# Patient Record
Sex: MALE | Race: BLACK OR AFRICAN AMERICAN | NOT HISPANIC OR LATINO
[De-identification: names, ages, dates, MRNs, and addresses within clinical notes are randomized per-mention and may not be internally consistent; named-entity substitution may affect disease eponyms.]

---

## 2020-11-03 PROBLEM — Z00.00 ENCOUNTER FOR PREVENTIVE HEALTH EXAMINATION: Status: ACTIVE | Noted: 2020-11-03

## 2020-12-07 ENCOUNTER — APPOINTMENT (OUTPATIENT)
Dept: UROLOGY | Facility: CLINIC | Age: 60
End: 2020-12-07
Payer: COMMERCIAL

## 2020-12-07 VITALS
WEIGHT: 220 LBS | HEIGHT: 68 IN | DIASTOLIC BLOOD PRESSURE: 80 MMHG | SYSTOLIC BLOOD PRESSURE: 157 MMHG | HEART RATE: 102 BPM | RESPIRATION RATE: 17 BRPM | TEMPERATURE: 97.6 F | BODY MASS INDEX: 33.34 KG/M2

## 2020-12-07 PROCEDURE — 99205 OFFICE O/P NEW HI 60 MIN: CPT

## 2020-12-07 PROCEDURE — 99072 ADDL SUPL MATRL&STAF TM PHE: CPT
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
## 2020-12-07 NOTE — ASSESSMENT
[FreeTextEntry1] : \par Needs RIGHT PCNL in 2-stages.\par Potential complications of bleeding, transfusion, selective angioembolization if indicated, adjacent organ injury, fever, sepsis, infection, incomplete stone clearance, bowel or other unusual injury, chest complications, vascular injuries, procedures needed to address any complications, ureteral injury, anesthetic complications, all discussed.\par

## 2020-12-07 NOTE — REVIEW OF SYSTEMS
[Eyesight Problems] : eyesight problems [Abdominal Pain] : abdominal pain [see HPI] : see HPI [Seen by urologist before (Name)  ___] : Preciously seen by a urologist: [unfilled] [Urine Infection (bladder/kidney)] : bladder/kidney infection [Told you have blood in urine on a urine test] : told blood was present in a urine test [History of kidney stones] : history of kidney stones [Wake up at night to urinate  How many times?  ___] : wakes up to urinate [unfilled] times during the night [Slow urine stream] : slow urine stream [Joint Pain] : joint pain [Difficulty Walking] : difficulty walking [Hot Flashes] : hot flashes [Negative] : Heme/Lymph

## 2020-12-07 NOTE — PHYSICAL EXAM
[General Appearance - Well Developed] : well developed [General Appearance - Well Nourished] : well nourished [Normal Appearance] : normal appearance [Well Groomed] : well groomed [General Appearance - In No Acute Distress] : no acute distress [] : no respiratory distress [Respiration, Rhythm And Depth] : normal respiratory rhythm and effort [Exaggerated Use Of Accessory Muscles For Inspiration] : no accessory muscle use [Abdomen Soft] : soft [Abdomen Tenderness] : non-tender [Costovertebral Angle Tenderness] : no ~M costovertebral angle tenderness [Normal Station and Gait] : the gait and station were normal for the patient's age [Skin Color & Pigmentation] : normal skin color and pigmentation [Oriented To Time, Place, And Person] : oriented to person, place, and time [Affect] : the affect was normal [Mood] : the mood was normal [Not Anxious] : not anxious

## 2021-01-26 ENCOUNTER — APPOINTMENT (OUTPATIENT)
Dept: UROLOGY | Facility: HOSPITAL | Age: 61
End: 2021-01-26

## 2021-02-23 ENCOUNTER — APPOINTMENT (OUTPATIENT)
Dept: UROLOGY | Facility: HOSPITAL | Age: 61
End: 2021-02-23

## 2021-02-25 ENCOUNTER — APPOINTMENT (OUTPATIENT)
Dept: UROLOGY | Facility: HOSPITAL | Age: 61
End: 2021-02-25

## 2021-06-08 ENCOUNTER — OUTPATIENT (OUTPATIENT)
Dept: OUTPATIENT SERVICES | Facility: HOSPITAL | Age: 61
LOS: 1 days | End: 2021-06-08
Payer: COMMERCIAL

## 2021-06-08 VITALS
HEART RATE: 103 BPM | RESPIRATION RATE: 14 BRPM | TEMPERATURE: 97 F | OXYGEN SATURATION: 97 % | WEIGHT: 217.82 LBS | SYSTOLIC BLOOD PRESSURE: 147 MMHG | HEIGHT: 68 IN | DIASTOLIC BLOOD PRESSURE: 92 MMHG

## 2021-06-08 DIAGNOSIS — Z98.1 ARTHRODESIS STATUS: Chronic | ICD-10-CM

## 2021-06-08 DIAGNOSIS — M51.36 OTHER INTERVERTEBRAL DISC DEGENERATION, LUMBAR REGION: Chronic | ICD-10-CM
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
## 2021-06-08 DIAGNOSIS — N20.0 CALCULUS OF KIDNEY: ICD-10-CM

## 2021-06-08 DIAGNOSIS — M48.061 SPINAL STENOSIS, LUMBAR REGION WITHOUT NEUROGENIC CLAUDICATION: Chronic | ICD-10-CM

## 2021-06-08 DIAGNOSIS — Z98.890 OTHER SPECIFIED POSTPROCEDURAL STATES: Chronic | ICD-10-CM

## 2021-06-08 DIAGNOSIS — Z91.89 OTHER SPECIFIED PERSONAL RISK FACTORS, NOT ELSEWHERE CLASSIFIED: ICD-10-CM

## 2021-06-08 DIAGNOSIS — E11.9 TYPE 2 DIABETES MELLITUS WITHOUT COMPLICATIONS: ICD-10-CM

## 2021-06-08 LAB
A1C WITH ESTIMATED AVERAGE GLUCOSE RESULT: 10.6 % — HIGH (ref 4–5.6)
ANION GAP SERPL CALC-SCNC: 15 MMOL/L — SIGNIFICANT CHANGE UP (ref 5–17)
BLD GP AB SCN SERPL QL: NEGATIVE — SIGNIFICANT CHANGE UP
BUN SERPL-MCNC: 15 MG/DL — SIGNIFICANT CHANGE UP (ref 7–23)
CALCIUM SERPL-MCNC: 9.8 MG/DL — SIGNIFICANT CHANGE UP (ref 8.4–10.5)
CHLORIDE SERPL-SCNC: 100 MMOL/L — SIGNIFICANT CHANGE UP (ref 96–108)
CO2 SERPL-SCNC: 22 MMOL/L — SIGNIFICANT CHANGE UP (ref 22–31)
CREAT SERPL-MCNC: 1.13 MG/DL — SIGNIFICANT CHANGE UP (ref 0.5–1.3)
ESTIMATED AVERAGE GLUCOSE: 258 MG/DL — HIGH (ref 68–114)
GLUCOSE SERPL-MCNC: 231 MG/DL — HIGH (ref 70–99)
HCT VFR BLD CALC: 43.5 % — SIGNIFICANT CHANGE UP (ref 39–50)
HGB BLD-MCNC: 14.2 G/DL — SIGNIFICANT CHANGE UP (ref 13–17)
MCHC RBC-ENTMCNC: 28.1 PG — SIGNIFICANT CHANGE UP (ref 27–34)
MCHC RBC-ENTMCNC: 32.6 GM/DL — SIGNIFICANT CHANGE UP (ref 32–36)
MCV RBC AUTO: 86 FL — SIGNIFICANT CHANGE UP (ref 80–100)
NRBC # BLD: 0 /100 WBCS — SIGNIFICANT CHANGE UP (ref 0–0)
PLATELET # BLD AUTO: 275 K/UL — SIGNIFICANT CHANGE UP (ref 150–400)
POTASSIUM SERPL-MCNC: 4 MMOL/L — SIGNIFICANT CHANGE UP (ref 3.5–5.3)
POTASSIUM SERPL-SCNC: 4 MMOL/L — SIGNIFICANT CHANGE UP (ref 3.5–5.3)
RBC # BLD: 5.06 M/UL — SIGNIFICANT CHANGE UP (ref 4.2–5.8)
RBC # FLD: 15.1 % — HIGH (ref 10.3–14.5)
RH IG SCN BLD-IMP: POSITIVE — SIGNIFICANT CHANGE UP
SODIUM SERPL-SCNC: 137 MMOL/L — SIGNIFICANT CHANGE UP (ref 135–145)
WBC # BLD: 10.12 K/UL — SIGNIFICANT CHANGE UP (ref 3.8–10.5)
WBC # FLD AUTO: 10.12 K/UL — SIGNIFICANT CHANGE UP (ref 3.8–10.5)

## 2021-06-08 RX ORDER — LIDOCAINE HCL 20 MG/ML
0.2 VIAL (ML) INJECTION ONCE
Refills: 0 | Status: DISCONTINUED | OUTPATIENT
Start: 2021-06-22 | End: 2021-06-24

## 2021-06-08 RX ORDER — SODIUM CHLORIDE 9 MG/ML
3 INJECTION INTRAMUSCULAR; INTRAVENOUS; SUBCUTANEOUS EVERY 8 HOURS
Refills: 0 | Status: DISCONTINUED | OUTPATIENT
Start: 2021-06-22 | End: 2021-06-24

## 2021-06-08 RX ORDER — CHLORHEXIDINE GLUCONATE 213 G/1000ML
1 SOLUTION TOPICAL ONCE
Refills: 0 | Status: DISCONTINUED | OUTPATIENT
Start: 2021-06-22 | End: 2021-06-24

## 2021-06-08 RX ORDER — CEFAZOLIN SODIUM 1 G
2000 VIAL (EA) INJECTION ONCE
Refills: 0 | Status: DISCONTINUED | OUTPATIENT
Start: 2021-06-22 | End: 2021-06-25

## 2021-06-08 NOTE — H&P PST ADULT - NSICDXPROBLEM_GEN_ALL_CORE_FT
PROBLEM DIAGNOSES  Problem: Staghorn calculus  Assessment and Plan: Stage I Right Percutaneous Nephrolithotomy for Right Staghorn Stone on 6/22/21  Stage II Right Percutaneous Nephrostolithotomy for Staghorn Stone on 6/24/2021    Problem: Type 2 diabetes mellitus  Assessment and Plan: Pt instructed to hold januvia and Glimeperide on am of surgery  Pt instructed to check FS on am of surgery  Stat FS on admission    Problem: At risk for sleep apnea  Assessment and Plan: RODOLFO Precautions  OR Booking notified
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
## 2021-06-08 NOTE — H&P PST ADULT - ACTIVITY
ADLs, walks 1-2 blocks, walks up 6 steps, light to moderate housework ADLs, walks 1-2 blocks, walks up 6 steps, light to moderate housework, activity limited by low back pain

## 2021-06-08 NOTE — H&P PST ADULT - NSANTHOSAYNRD_GEN_A_CORE
No. RODOLFO screening performed.  STOP BANG Legend: 0-2 = LOW Risk; 3-4 = INTERMEDIATE Risk; 5-8 = HIGH Risk neck = 19.5 inches/No. RODOLFO screening performed.  STOP BANG Legend: 0-2 = LOW Risk; 3-4 = INTERMEDIATE Risk; 5-8 = HIGH Risk

## 2021-06-08 NOTE — H&P PST ADULT - RESPIRATORY AND THORAX
Spoke to ER regarding pt status. In addition to continuing HCTZ, will add a low dose BB to regiment  Will reach out to pt and schedule appmnt for T/W of next week to f/u closely. Please schedule as appropriate with either PCP or myself. Thank you! negative
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
## 2021-06-09 LAB
CULTURE RESULTS: NO GROWTH — SIGNIFICANT CHANGE UP
SPECIMEN SOURCE: SIGNIFICANT CHANGE UP

## 2021-06-16 PROBLEM — N20.0 CALCULUS OF KIDNEY: Chronic | Status: ACTIVE | Noted: 2021-06-08

## 2021-06-16 PROBLEM — E78.5 HYPERLIPIDEMIA, UNSPECIFIED: Chronic | Status: ACTIVE | Noted: 2021-06-08

## 2021-06-16 PROBLEM — M51.36 OTHER INTERVERTEBRAL DISC DEGENERATION, LUMBAR REGION: Chronic | Status: ACTIVE | Noted: 2021-06-08

## 2021-06-16 PROBLEM — E66.9 OBESITY, UNSPECIFIED: Chronic | Status: ACTIVE | Noted: 2021-06-08

## 2021-06-16 PROBLEM — I10 ESSENTIAL (PRIMARY) HYPERTENSION: Chronic | Status: ACTIVE | Noted: 2021-06-08

## 2021-06-16 PROBLEM — E11.9 TYPE 2 DIABETES MELLITUS WITHOUT COMPLICATIONS: Chronic | Status: ACTIVE | Noted: 2021-06-08
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
## 2021-06-16 PROBLEM — Z91.89 OTHER SPECIFIED PERSONAL RISK FACTORS, NOT ELSEWHERE CLASSIFIED: Chronic | Status: ACTIVE | Noted: 2021-06-08

## 2021-06-16 PROBLEM — M48.061 SPINAL STENOSIS, LUMBAR REGION WITHOUT NEUROGENIC CLAUDICATION: Chronic | Status: ACTIVE | Noted: 2021-06-08

## 2021-06-21 ENCOUNTER — TRANSCRIPTION ENCOUNTER (OUTPATIENT)
Age: 61
End: 2021-06-21

## 2021-06-22 ENCOUNTER — INPATIENT (INPATIENT)
Facility: HOSPITAL | Age: 61
LOS: 2 days | Discharge: HOME | DRG: 661 | End: 2021-06-25
Attending: UROLOGY | Admitting: UROLOGY
Payer: COMMERCIAL

## 2021-06-22 ENCOUNTER — APPOINTMENT (OUTPATIENT)
Dept: UROLOGY | Facility: HOSPITAL | Age: 61
End: 2021-06-22

## 2021-06-22 VITALS
WEIGHT: 217.82 LBS | RESPIRATION RATE: 18 BRPM | HEIGHT: 68 IN | DIASTOLIC BLOOD PRESSURE: 98 MMHG | SYSTOLIC BLOOD PRESSURE: 159 MMHG | HEART RATE: 109 BPM | OXYGEN SATURATION: 96 % | TEMPERATURE: 99 F

## 2021-06-22 DIAGNOSIS — Z98.890 OTHER SPECIFIED POSTPROCEDURAL STATES: Chronic | ICD-10-CM

## 2021-06-22 DIAGNOSIS — N20.0 CALCULUS OF KIDNEY: ICD-10-CM

## 2021-06-22 DIAGNOSIS — Z98.1 ARTHRODESIS STATUS: Chronic | ICD-10-CM

## 2021-06-22 LAB
ANION GAP SERPL CALC-SCNC: 18 MMOL/L — HIGH (ref 5–17)
BASOPHILS # BLD AUTO: 0.05 K/UL — SIGNIFICANT CHANGE UP (ref 0–0.2)
BASOPHILS NFR BLD AUTO: 0.3 % — SIGNIFICANT CHANGE UP (ref 0–2)
BUN SERPL-MCNC: 19 MG/DL — SIGNIFICANT CHANGE UP (ref 7–23)
CALCIUM SERPL-MCNC: 9.2 MG/DL — SIGNIFICANT CHANGE UP (ref 8.4–10.5)
CHLORIDE SERPL-SCNC: 100 MMOL/L — SIGNIFICANT CHANGE UP (ref 96–108)
CO2 SERPL-SCNC: 21 MMOL/L — LOW (ref 22–31)
CREAT SERPL-MCNC: 1.22 MG/DL — SIGNIFICANT CHANGE UP (ref 0.5–1.3)
EOSINOPHIL # BLD AUTO: 0.11 K/UL — SIGNIFICANT CHANGE UP (ref 0–0.5)
EOSINOPHIL NFR BLD AUTO: 0.7 % — SIGNIFICANT CHANGE UP (ref 0–6)
GLUCOSE BLDC GLUCOMTR-MCNC: 200 MG/DL — HIGH (ref 70–99)
GLUCOSE BLDC GLUCOMTR-MCNC: 372 MG/DL — HIGH (ref 70–99)
GLUCOSE BLDC GLUCOMTR-MCNC: 373 MG/DL — HIGH (ref 70–99)
GLUCOSE SERPL-MCNC: 352 MG/DL — HIGH (ref 70–99)
HCT VFR BLD CALC: 41.4 % — SIGNIFICANT CHANGE UP (ref 39–50)
HGB BLD-MCNC: 13.3 G/DL — SIGNIFICANT CHANGE UP (ref 13–17)
IMM GRANULOCYTES NFR BLD AUTO: 0.5 % — SIGNIFICANT CHANGE UP (ref 0–1.5)
LYMPHOCYTES # BLD AUTO: 17.9 % — SIGNIFICANT CHANGE UP (ref 13–44)
LYMPHOCYTES # BLD AUTO: 3.01 K/UL — SIGNIFICANT CHANGE UP (ref 1–3.3)
MCHC RBC-ENTMCNC: 28.4 PG — SIGNIFICANT CHANGE UP (ref 27–34)
MCHC RBC-ENTMCNC: 32.1 GM/DL — SIGNIFICANT CHANGE UP (ref 32–36)
MCV RBC AUTO: 88.3 FL — SIGNIFICANT CHANGE UP (ref 80–100)
MONOCYTES # BLD AUTO: 0.37 K/UL — SIGNIFICANT CHANGE UP (ref 0–0.9)
MONOCYTES NFR BLD AUTO: 2.2 % — SIGNIFICANT CHANGE UP (ref 2–14)
NEUTROPHILS # BLD AUTO: 13.17 K/UL — HIGH (ref 1.8–7.4)
NEUTROPHILS NFR BLD AUTO: 78.4 % — HIGH (ref 43–77)
NRBC # BLD: 0 /100 WBCS — SIGNIFICANT CHANGE UP (ref 0–0)
PLATELET # BLD AUTO: 271 K/UL — SIGNIFICANT CHANGE UP (ref 150–400)
POTASSIUM SERPL-MCNC: 4 MMOL/L — SIGNIFICANT CHANGE UP (ref 3.5–5.3)
POTASSIUM SERPL-SCNC: 4 MMOL/L — SIGNIFICANT CHANGE UP (ref 3.5–5.3)
RBC # BLD: 4.69 M/UL — SIGNIFICANT CHANGE UP (ref 4.2–5.8)
RBC # FLD: 15 % — HIGH (ref 10.3–14.5)
SODIUM SERPL-SCNC: 139 MMOL/L — SIGNIFICANT CHANGE UP (ref 135–145)
WBC # BLD: 16.8 K/UL — HIGH (ref 3.8–10.5)
WBC # FLD AUTO: 16.8 K/UL — HIGH (ref 3.8–10.5)

## 2021-06-22 PROCEDURE — 71045 X-RAY EXAM CHEST 1 VIEW: CPT | Mod: 26

## 2021-06-22 PROCEDURE — 86901 BLOOD TYPING SEROLOGIC RH(D): CPT

## 2021-06-22 PROCEDURE — 85027 COMPLETE CBC AUTOMATED: CPT

## 2021-06-22 PROCEDURE — G0463: CPT

## 2021-06-22 PROCEDURE — 86900 BLOOD TYPING SEROLOGIC ABO: CPT

## 2021-06-22 PROCEDURE — 76000 FLUOROSCOPY <1 HR PHYS/QHP: CPT

## 2021-06-22 PROCEDURE — 80048 BASIC METABOLIC PNL TOTAL CA: CPT

## 2021-06-22 PROCEDURE — 87086 URINE CULTURE/COLONY COUNT: CPT

## 2021-06-22 PROCEDURE — 83036 HEMOGLOBIN GLYCOSYLATED A1C: CPT

## 2021-06-22 PROCEDURE — 86850 RBC ANTIBODY SCREEN: CPT

## 2021-06-22 RX ORDER — AMLODIPINE BESYLATE 2.5 MG/1
10 TABLET ORAL DAILY
Refills: 0 | Status: DISCONTINUED | OUTPATIENT
Start: 2021-06-22 | End: 2021-06-24

## 2021-06-22 RX ORDER — LORATADINE 10 MG/1
1 TABLET ORAL
Qty: 0 | Refills: 0 | DISCHARGE

## 2021-06-22 RX ORDER — ATORVASTATIN CALCIUM 80 MG/1
10 TABLET, FILM COATED ORAL AT BEDTIME
Refills: 0 | Status: DISCONTINUED | OUTPATIENT
Start: 2021-06-22 | End: 2021-06-24

## 2021-06-22 RX ORDER — INSULIN LISPRO 100/ML
VIAL (ML) SUBCUTANEOUS AT BEDTIME
Refills: 0 | Status: DISCONTINUED | OUTPATIENT
Start: 2021-06-22 | End: 2021-06-23

## 2021-06-22 RX ORDER — DEXTROSE 50 % IN WATER 50 %
25 SYRINGE (ML) INTRAVENOUS ONCE
Refills: 0 | Status: DISCONTINUED | OUTPATIENT
Start: 2021-06-22 | End: 2021-06-24

## 2021-06-22 RX ORDER — AMLODIPINE BESYLATE 2.5 MG/1
1 TABLET ORAL
Qty: 0 | Refills: 0 | DISCHARGE

## 2021-06-22 RX ORDER — GLUCAGON INJECTION, SOLUTION 0.5 MG/.1ML
1 INJECTION, SOLUTION SUBCUTANEOUS ONCE
Refills: 0 | Status: DISCONTINUED | OUTPATIENT
Start: 2021-06-22 | End: 2021-06-24

## 2021-06-22 RX ORDER — BENZOCAINE AND MENTHOL 5; 1 G/100ML; G/100ML
1 LIQUID ORAL
Refills: 0 | Status: DISCONTINUED | OUTPATIENT
Start: 2021-06-22 | End: 2021-06-24

## 2021-06-22 RX ORDER — ONDANSETRON 8 MG/1
4 TABLET, FILM COATED ORAL ONCE
Refills: 0 | Status: DISCONTINUED | OUTPATIENT
Start: 2021-06-22 | End: 2021-06-22

## 2021-06-22 RX ORDER — SODIUM CHLORIDE 9 MG/ML
1000 INJECTION, SOLUTION INTRAVENOUS
Refills: 0 | Status: DISCONTINUED | OUTPATIENT
Start: 2021-06-22 | End: 2021-06-24

## 2021-06-22 RX ORDER — ATORVASTATIN CALCIUM 80 MG/1
1 TABLET, FILM COATED ORAL
Qty: 0 | Refills: 0 | DISCHARGE

## 2021-06-22 RX ORDER — SENNA PLUS 8.6 MG/1
2 TABLET ORAL AT BEDTIME
Refills: 0 | Status: DISCONTINUED | OUTPATIENT
Start: 2021-06-22 | End: 2021-06-24

## 2021-06-22 RX ORDER — POTASSIUM CITRATE MONOHYDRATE 100 %
1 POWDER (GRAM) MISCELLANEOUS
Qty: 0 | Refills: 0 | DISCHARGE

## 2021-06-22 RX ORDER — DEXTROSE 50 % IN WATER 50 %
15 SYRINGE (ML) INTRAVENOUS ONCE
Refills: 0 | Status: DISCONTINUED | OUTPATIENT
Start: 2021-06-22 | End: 2021-06-24

## 2021-06-22 RX ORDER — ACETAMINOPHEN 500 MG
650 TABLET ORAL EVERY 6 HOURS
Refills: 0 | Status: DISCONTINUED | OUTPATIENT
Start: 2021-06-22 | End: 2021-06-24

## 2021-06-22 RX ORDER — HYDROMORPHONE HYDROCHLORIDE 2 MG/ML
0.25 INJECTION INTRAMUSCULAR; INTRAVENOUS; SUBCUTANEOUS
Refills: 0 | Status: DISCONTINUED | OUTPATIENT
Start: 2021-06-22 | End: 2021-06-22

## 2021-06-22 RX ORDER — OXYCODONE HYDROCHLORIDE 5 MG/1
10 TABLET ORAL EVERY 4 HOURS
Refills: 0 | Status: DISCONTINUED | OUTPATIENT
Start: 2021-06-22 | End: 2021-06-24

## 2021-06-22 RX ORDER — INSULIN LISPRO 100/ML
VIAL (ML) SUBCUTANEOUS
Refills: 0 | Status: DISCONTINUED | OUTPATIENT
Start: 2021-06-22 | End: 2021-06-23

## 2021-06-22 RX ORDER — CEFAZOLIN SODIUM 1 G
1000 VIAL (EA) INJECTION EVERY 8 HOURS
Refills: 0 | Status: DISCONTINUED | OUTPATIENT
Start: 2021-06-22 | End: 2021-06-24

## 2021-06-22 RX ORDER — OXYCODONE HYDROCHLORIDE 5 MG/1
5 TABLET ORAL EVERY 4 HOURS
Refills: 0 | Status: DISCONTINUED | OUTPATIENT
Start: 2021-06-22 | End: 2021-06-24

## 2021-06-22 RX ORDER — HEPARIN SODIUM 5000 [USP'U]/ML
5000 INJECTION INTRAVENOUS; SUBCUTANEOUS EVERY 8 HOURS
Refills: 0 | Status: DISCONTINUED | OUTPATIENT
Start: 2021-06-22 | End: 2021-06-24

## 2021-06-22 RX ADMIN — HYDROMORPHONE HYDROCHLORIDE 0.25 MILLIGRAM(S): 2 INJECTION INTRAMUSCULAR; INTRAVENOUS; SUBCUTANEOUS at 19:25

## 2021-06-22 RX ADMIN — OXYCODONE HYDROCHLORIDE 10 MILLIGRAM(S): 5 TABLET ORAL at 21:37

## 2021-06-22 RX ADMIN — Medication 3: at 22:00

## 2021-06-22 RX ADMIN — HYDROMORPHONE HYDROCHLORIDE 0.25 MILLIGRAM(S): 2 INJECTION INTRAMUSCULAR; INTRAVENOUS; SUBCUTANEOUS at 19:35

## 2021-06-22 RX ADMIN — Medication 5: at 20:37

## 2021-06-22 RX ADMIN — HYDROMORPHONE HYDROCHLORIDE 0.25 MILLIGRAM(S): 2 INJECTION INTRAMUSCULAR; INTRAVENOUS; SUBCUTANEOUS at 20:16

## 2021-06-22 RX ADMIN — OXYCODONE HYDROCHLORIDE 10 MILLIGRAM(S): 5 TABLET ORAL at 21:52

## 2021-06-22 RX ADMIN — HYDROMORPHONE HYDROCHLORIDE 0.25 MILLIGRAM(S): 2 INJECTION INTRAMUSCULAR; INTRAVENOUS; SUBCUTANEOUS at 20:33

## 2021-06-22 RX ADMIN — HYDROMORPHONE HYDROCHLORIDE 0.25 MILLIGRAM(S): 2 INJECTION INTRAMUSCULAR; INTRAVENOUS; SUBCUTANEOUS at 21:05

## 2021-06-22 RX ADMIN — HYDROMORPHONE HYDROCHLORIDE 0.25 MILLIGRAM(S): 2 INJECTION INTRAMUSCULAR; INTRAVENOUS; SUBCUTANEOUS at 20:46

## 2021-06-22 RX ADMIN — SODIUM CHLORIDE 125 MILLILITER(S): 9 INJECTION, SOLUTION INTRAVENOUS at 19:00

## 2021-06-22 NOTE — ASU PATIENT PROFILE, ADULT - VISION (WITH CORRECTIVE LENSES IF THE PATIENT USUALLY WEARS THEM):
Normal vision: sees adequately in most situations; can see medication labels, newsprint
Medical Records sent

## 2021-06-23 ENCOUNTER — RESULT REVIEW (OUTPATIENT)
Age: 61
End: 2021-06-23

## 2021-06-23 ENCOUNTER — TRANSCRIPTION ENCOUNTER (OUTPATIENT)
Age: 61
End: 2021-06-23

## 2021-06-23 DIAGNOSIS — E11.65 TYPE 2 DIABETES MELLITUS WITH HYPERGLYCEMIA: ICD-10-CM

## 2021-06-23 DIAGNOSIS — E78.5 HYPERLIPIDEMIA, UNSPECIFIED: ICD-10-CM

## 2021-06-23 DIAGNOSIS — I10 ESSENTIAL (PRIMARY) HYPERTENSION: ICD-10-CM

## 2021-06-23 LAB
ANION GAP SERPL CALC-SCNC: 18 MMOL/L — HIGH (ref 5–17)
BASOPHILS # BLD AUTO: 0.02 K/UL — SIGNIFICANT CHANGE UP (ref 0–0.2)
BASOPHILS NFR BLD AUTO: 0.2 % — SIGNIFICANT CHANGE UP (ref 0–2)
BUN SERPL-MCNC: 19 MG/DL — SIGNIFICANT CHANGE UP (ref 7–23)
CALCIUM SERPL-MCNC: 9.5 MG/DL — SIGNIFICANT CHANGE UP (ref 8.4–10.5)
CHLORIDE SERPL-SCNC: 98 MMOL/L — SIGNIFICANT CHANGE UP (ref 96–108)
CO2 SERPL-SCNC: 20 MMOL/L — LOW (ref 22–31)
COVID-19 SPIKE DOMAIN AB INTERP: POSITIVE
COVID-19 SPIKE DOMAIN ANTIBODY RESULT: >250 U/ML — HIGH
CREAT SERPL-MCNC: 1.15 MG/DL — SIGNIFICANT CHANGE UP (ref 0.5–1.3)
EOSINOPHIL # BLD AUTO: 0.04 K/UL — SIGNIFICANT CHANGE UP (ref 0–0.5)
EOSINOPHIL NFR BLD AUTO: 0.3 % — SIGNIFICANT CHANGE UP (ref 0–6)
GLUCOSE BLDC GLUCOMTR-MCNC: 206 MG/DL — HIGH (ref 70–99)
GLUCOSE BLDC GLUCOMTR-MCNC: 232 MG/DL — HIGH (ref 70–99)
GLUCOSE BLDC GLUCOMTR-MCNC: 283 MG/DL — HIGH (ref 70–99)
GLUCOSE BLDC GLUCOMTR-MCNC: 311 MG/DL — HIGH (ref 70–99)
GLUCOSE BLDC GLUCOMTR-MCNC: 311 MG/DL — HIGH (ref 70–99)
GLUCOSE SERPL-MCNC: 329 MG/DL — HIGH (ref 70–99)
HCT VFR BLD CALC: 37.7 % — LOW (ref 39–50)
HGB BLD-MCNC: 12.2 G/DL — LOW (ref 13–17)
IMM GRANULOCYTES NFR BLD AUTO: 0.5 % — SIGNIFICANT CHANGE UP (ref 0–1.5)
LYMPHOCYTES # BLD AUTO: 1.51 K/UL — SIGNIFICANT CHANGE UP (ref 1–3.3)
LYMPHOCYTES # BLD AUTO: 13 % — SIGNIFICANT CHANGE UP (ref 13–44)
MCHC RBC-ENTMCNC: 28.2 PG — SIGNIFICANT CHANGE UP (ref 27–34)
MCHC RBC-ENTMCNC: 32.4 GM/DL — SIGNIFICANT CHANGE UP (ref 32–36)
MCV RBC AUTO: 87.1 FL — SIGNIFICANT CHANGE UP (ref 80–100)
MONOCYTES # BLD AUTO: 0.53 K/UL — SIGNIFICANT CHANGE UP (ref 0–0.9)
MONOCYTES NFR BLD AUTO: 4.6 % — SIGNIFICANT CHANGE UP (ref 2–14)
NEUTROPHILS # BLD AUTO: 9.42 K/UL — HIGH (ref 1.8–7.4)
NEUTROPHILS NFR BLD AUTO: 81.4 % — HIGH (ref 43–77)
NRBC # BLD: 0 /100 WBCS — SIGNIFICANT CHANGE UP (ref 0–0)
PLATELET # BLD AUTO: 253 K/UL — SIGNIFICANT CHANGE UP (ref 150–400)
POTASSIUM SERPL-MCNC: 5 MMOL/L — SIGNIFICANT CHANGE UP (ref 3.5–5.3)
POTASSIUM SERPL-SCNC: 5 MMOL/L — SIGNIFICANT CHANGE UP (ref 3.5–5.3)
RBC # BLD: 4.33 M/UL — SIGNIFICANT CHANGE UP (ref 4.2–5.8)
RBC # FLD: 15.3 % — HIGH (ref 10.3–14.5)
SARS-COV-2 IGG+IGM SERPL QL IA: >250 U/ML — HIGH
SARS-COV-2 IGG+IGM SERPL QL IA: POSITIVE
SODIUM SERPL-SCNC: 136 MMOL/L — SIGNIFICANT CHANGE UP (ref 135–145)
WBC # BLD: 11.58 K/UL — HIGH (ref 3.8–10.5)
WBC # FLD AUTO: 11.58 K/UL — HIGH (ref 3.8–10.5)

## 2021-06-23 PROCEDURE — 99223 1ST HOSP IP/OBS HIGH 75: CPT | Mod: GC

## 2021-06-23 PROCEDURE — 88300 SURGICAL PATH GROSS: CPT | Mod: 26

## 2021-06-23 PROCEDURE — 93010 ELECTROCARDIOGRAM REPORT: CPT

## 2021-06-23 PROCEDURE — 74176 CT ABD & PELVIS W/O CONTRAST: CPT | Mod: 26

## 2021-06-23 RX ORDER — INSULIN LISPRO 100/ML
VIAL (ML) SUBCUTANEOUS AT BEDTIME
Refills: 0 | Status: DISCONTINUED | OUTPATIENT
Start: 2021-06-23 | End: 2021-06-23

## 2021-06-23 RX ORDER — INSULIN LISPRO 100/ML
7 VIAL (ML) SUBCUTANEOUS
Refills: 0 | Status: DISCONTINUED | OUTPATIENT
Start: 2021-06-23 | End: 2021-06-23

## 2021-06-23 RX ORDER — ONDANSETRON 8 MG/1
4 TABLET, FILM COATED ORAL ONCE
Refills: 0 | Status: COMPLETED | OUTPATIENT
Start: 2021-06-23 | End: 2021-06-23

## 2021-06-23 RX ORDER — INSULIN LISPRO 100/ML
6 VIAL (ML) SUBCUTANEOUS
Refills: 0 | Status: DISCONTINUED | OUTPATIENT
Start: 2021-06-23 | End: 2021-06-23

## 2021-06-23 RX ORDER — INSULIN GLARGINE 100 [IU]/ML
16 INJECTION, SOLUTION SUBCUTANEOUS AT BEDTIME
Refills: 0 | Status: DISCONTINUED | OUTPATIENT
Start: 2021-06-23 | End: 2021-06-24

## 2021-06-23 RX ORDER — DEXTROSE 50 % IN WATER 50 %
15 SYRINGE (ML) INTRAVENOUS ONCE
Refills: 0 | Status: DISCONTINUED | OUTPATIENT
Start: 2021-06-23 | End: 2021-06-24

## 2021-06-23 RX ORDER — INSULIN LISPRO 100/ML
VIAL (ML) SUBCUTANEOUS EVERY 6 HOURS
Refills: 0 | Status: DISCONTINUED | OUTPATIENT
Start: 2021-06-23 | End: 2021-06-24

## 2021-06-23 RX ORDER — INSULIN GLARGINE 100 [IU]/ML
10 INJECTION, SOLUTION SUBCUTANEOUS AT BEDTIME
Refills: 0 | Status: DISCONTINUED | OUTPATIENT
Start: 2021-06-23 | End: 2021-06-23

## 2021-06-23 RX ORDER — INSULIN LISPRO 100/ML
VIAL (ML) SUBCUTANEOUS
Refills: 0 | Status: DISCONTINUED | OUTPATIENT
Start: 2021-06-23 | End: 2021-06-23

## 2021-06-23 RX ORDER — GLYCERIN 1 %
1 DROPS OPHTHALMIC (EYE) EVERY 8 HOURS
Refills: 0 | Status: DISCONTINUED | OUTPATIENT
Start: 2021-06-23 | End: 2021-06-24

## 2021-06-23 RX ORDER — POLYETHYLENE GLYCOL 3350 17 G/17G
17 POWDER, FOR SOLUTION ORAL DAILY
Refills: 0 | Status: DISCONTINUED | OUTPATIENT
Start: 2021-06-23 | End: 2021-06-24

## 2021-06-23 RX ORDER — SIMETHICONE 80 MG/1
80 TABLET, CHEWABLE ORAL ONCE
Refills: 0 | Status: COMPLETED | OUTPATIENT
Start: 2021-06-23 | End: 2021-06-23

## 2021-06-23 RX ADMIN — Medication 100 MILLIGRAM(S): at 16:47

## 2021-06-23 RX ADMIN — HEPARIN SODIUM 5000 UNIT(S): 5000 INJECTION INTRAVENOUS; SUBCUTANEOUS at 08:58

## 2021-06-23 RX ADMIN — SODIUM CHLORIDE 3 MILLILITER(S): 9 INJECTION INTRAMUSCULAR; INTRAVENOUS; SUBCUTANEOUS at 05:30

## 2021-06-23 RX ADMIN — Medication 100 MILLIGRAM(S): at 00:47

## 2021-06-23 RX ADMIN — OXYCODONE HYDROCHLORIDE 5 MILLIGRAM(S): 5 TABLET ORAL at 00:30

## 2021-06-23 RX ADMIN — HEPARIN SODIUM 5000 UNIT(S): 5000 INJECTION INTRAVENOUS; SUBCUTANEOUS at 00:47

## 2021-06-23 RX ADMIN — BENZOCAINE AND MENTHOL 1 LOZENGE: 5; 1 LIQUID ORAL at 00:00

## 2021-06-23 RX ADMIN — OXYCODONE HYDROCHLORIDE 10 MILLIGRAM(S): 5 TABLET ORAL at 22:55

## 2021-06-23 RX ADMIN — POLYETHYLENE GLYCOL 3350 17 GRAM(S): 17 POWDER, FOR SOLUTION ORAL at 12:58

## 2021-06-23 RX ADMIN — OXYCODONE HYDROCHLORIDE 5 MILLIGRAM(S): 5 TABLET ORAL at 00:00

## 2021-06-23 RX ADMIN — Medication 10 MILLIGRAM(S): at 05:19

## 2021-06-23 RX ADMIN — SODIUM CHLORIDE 3 MILLILITER(S): 9 INJECTION INTRAMUSCULAR; INTRAVENOUS; SUBCUTANEOUS at 23:21

## 2021-06-23 RX ADMIN — AMLODIPINE BESYLATE 10 MILLIGRAM(S): 2.5 TABLET ORAL at 05:19

## 2021-06-23 RX ADMIN — SODIUM CHLORIDE 125 MILLILITER(S): 9 INJECTION, SOLUTION INTRAVENOUS at 12:58

## 2021-06-23 RX ADMIN — Medication 6: at 14:56

## 2021-06-23 RX ADMIN — SIMETHICONE 80 MILLIGRAM(S): 80 TABLET, CHEWABLE ORAL at 22:40

## 2021-06-23 RX ADMIN — ATORVASTATIN CALCIUM 10 MILLIGRAM(S): 80 TABLET, FILM COATED ORAL at 00:53

## 2021-06-23 RX ADMIN — ATORVASTATIN CALCIUM 10 MILLIGRAM(S): 80 TABLET, FILM COATED ORAL at 22:55

## 2021-06-23 RX ADMIN — OXYCODONE HYDROCHLORIDE 10 MILLIGRAM(S): 5 TABLET ORAL at 02:17

## 2021-06-23 RX ADMIN — INSULIN GLARGINE 16 UNIT(S): 100 INJECTION, SOLUTION SUBCUTANEOUS at 23:00

## 2021-06-23 RX ADMIN — OXYCODONE HYDROCHLORIDE 10 MILLIGRAM(S): 5 TABLET ORAL at 02:47

## 2021-06-23 RX ADMIN — BENZOCAINE AND MENTHOL 1 LOZENGE: 5; 1 LIQUID ORAL at 23:46

## 2021-06-23 RX ADMIN — Medication 1 DROP(S): at 18:34

## 2021-06-23 RX ADMIN — Medication 100 MILLIGRAM(S): at 08:58

## 2021-06-23 RX ADMIN — SODIUM CHLORIDE 3 MILLILITER(S): 9 INJECTION INTRAMUSCULAR; INTRAVENOUS; SUBCUTANEOUS at 01:13

## 2021-06-23 RX ADMIN — HEPARIN SODIUM 5000 UNIT(S): 5000 INJECTION INTRAVENOUS; SUBCUTANEOUS at 16:50

## 2021-06-23 RX ADMIN — Medication 8: at 08:56

## 2021-06-23 RX ADMIN — OXYCODONE HYDROCHLORIDE 10 MILLIGRAM(S): 5 TABLET ORAL at 23:25

## 2021-06-23 RX ADMIN — ONDANSETRON 4 MILLIGRAM(S): 8 TABLET, FILM COATED ORAL at 07:31

## 2021-06-23 RX ADMIN — SODIUM CHLORIDE 3 MILLILITER(S): 9 INJECTION INTRAMUSCULAR; INTRAVENOUS; SUBCUTANEOUS at 15:00

## 2021-06-23 RX ADMIN — BENZOCAINE AND MENTHOL 1 LOZENGE: 5; 1 LIQUID ORAL at 16:48

## 2021-06-23 RX ADMIN — Medication 4: at 23:47

## 2021-06-23 NOTE — CONSULT NOTE ADULT - ATTENDING COMMENTS
Agree with assessment and plan as above by Dr. Tay. Reviewed all pertinent labs, glucose values, and imaging studies. Modifications made as indicated above. Pt. to be NPO for procedure tomorrow. Recommend Lantus 16 units qhs. Admelog 6 units with meals. Pt. reluctant to be discharged on insulin would consider GLP-1 instead of januvia and adding SGLT2.     Palmer Ortiz D.O  [phone]
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
## 2021-06-23 NOTE — CONSULT NOTE ADULT - ASSESSMENT
59 yo M with T2DM (A1C 10.6 on orals). Also with HTN, HLD, recurrent nephrolithiasis per pt. Admitted for percutaneous nephrolithotomy    #T2DM uncontrolled with suspected stress exacerbated hyperglycemia from illness  - goal glucose 100-180  - Lantus 20 units qhs  - Admelog 7 units TID pre-meal  - moderate pre-meal correction scale and moderate HS correction scale  - check FS AC/HS  - carb consistent diet  - registered dietician eval  Discharge    #HTN    #HLD        61 yo M with T2DM (A1C 10.6 on orals). Also with HTN, HLD, recurrent nephrolithiasis per pt. Admitted for percutaneous nephrolithotomy    #T2DM uncontrolled with suspected stress exacerbated hyperglycemia from illness. Glucose levels above goal on moderate scales. Poor PO intake. NPO MN for OR tomorrow (scheduled for later afternoon per team)  - goal glucose 100-180  - Lantus 16 units qhs (would have recommended 18 units but given NPO until late afternoon tomorrow, will decrease dose slightly tonight)  - Admelog 6 units TID pre-meal  - moderate pre-meal correction scale and moderate HS correction scale  - check FS AC/HS  - carb consistent diet  - registered dietician eval  Discharge  - Anticipate discharge on GLP-1 agonist + SGLT-2i with dietary modifications. Will finalize.  - outpatient f/u with Dr. Yecenia Le Address: [address] [place] [address] [address] [id number]  Phone: [phone]    #HTN  goal BP<130/80 (currently above goal)  manage per primary team    #HLD  continue statin    DW Team    Rosa Isela Tay DO, Endocrine Fellow   Pager [phone] from 9-5PM. After hours and on weekends please call [phone].      59 yo M with T2DM (A1C 10.6 on orals). Also with HTN, HLD, recurrent nephrolithiasis per pt. Admitted for percutaneous nephrolithotomy. Endocrine consulted for T2DM management    #T2DM uncontrolled with hyperglycemia likely stress exacerbated from illness. Glucose levels above goal on moderate scales. Poor PO intake. NPO MN for OR tomorrow (scheduled for later afternoon per team)  - goal glucose 100-180  - Lantus 16 units qhs (would have recommended 18 units but given NPO until late afternoon tomorrow, will decrease dose slightly tonight)  - Admelog 6 units TID pre-meal  - moderate pre-meal correction scale and moderate HS correction scale  - check FS AC/HS  - carb consistent diet  - registered dietician eval  Discharge  - Anticipate discharge on GLP-1 agonist + SGLT-2i with dietary modifications. Will finalize.  - outpatient f/u with Dr. Yecenia Le Address: [address] [place] [address] [address], [place] [id number]  Phone: [phone]    #HTN  goal BP<130/80 (currently above goal)  manage per primary team    #HLD  continue statin    DW Team    Rosa Isela Tay DO, Endocrine Fellow   Pager [phone] from 9-5PM. After hours and on weekends please call [phone].      61 yo M with T2DM (A1C 10.6 on orals). Also with HTN, HLD, recurrent nephrolithiasis per pt. Admitted for percutaneous nephrolithotomy. Endocrine consulted for T2DM management (high risk patient with severely uncontrolled Type 2 DM w/ A1c of 10.6% at high risk of CAD and CVA with high level decision-making).     #T2DM uncontrolled with hyperglycemia likely stress exacerbated from illness. Glucose levels above goal on moderate scales. Poor PO intake. NPO [place] for OR tomorrow (scheduled for later afternoon per team)  - goal glucose 100-180  - Lantus 16 units qhs (would have recommended 18 units but given NPO until late afternoon tomorrow, will decrease dose slightly tonight)  - Admelog 6 units TID pre-meal  - moderate pre-meal correction scale and moderate HS correction scale  - check FS AC/HS  - carb consistent diet  - registered dietician eval  Discharge  - Anticipate discharge on GLP-1 agonist + SGLT-2i with dietary modifications. Will finalize.  - outpatient f/u with Dr. Yecenia Le Address: [address] [place] [address] [address], [place] [id number]  Phone: [phone]    #HTN  goal BP<130/80 (currently above goal)  manage per primary team    #HLD  continue statin    DW Team    Rosa Isela Tay DO, Endocrine Fellow   Pager [phone] from 9-5PM. After hours and on weekends please call [phone].

## 2021-06-24 ENCOUNTER — APPOINTMENT (OUTPATIENT)
Dept: UROLOGY | Facility: HOSPITAL | Age: 61
End: 2021-06-24

## 2021-06-24 ENCOUNTER — RESULT REVIEW (OUTPATIENT)
Age: 61
End: 2021-06-24

## 2021-06-24 LAB
ANION GAP SERPL CALC-SCNC: 15 MMOL/L — SIGNIFICANT CHANGE UP (ref 5–17)
ANION GAP SERPL CALC-SCNC: 16 MMOL/L — SIGNIFICANT CHANGE UP (ref 5–17)
BLD GP AB SCN SERPL QL: NEGATIVE — SIGNIFICANT CHANGE UP
BUN SERPL-MCNC: 17 MG/DL — SIGNIFICANT CHANGE UP (ref 7–23)
BUN SERPL-MCNC: 17 MG/DL — SIGNIFICANT CHANGE UP (ref 7–23)
CALCIUM SERPL-MCNC: 8.9 MG/DL — SIGNIFICANT CHANGE UP (ref 8.4–10.5)
CALCIUM SERPL-MCNC: 9.1 MG/DL — SIGNIFICANT CHANGE UP (ref 8.4–10.5)
CHLORIDE SERPL-SCNC: 101 MMOL/L — SIGNIFICANT CHANGE UP (ref 96–108)
CHLORIDE SERPL-SCNC: 99 MMOL/L — SIGNIFICANT CHANGE UP (ref 96–108)
CO2 SERPL-SCNC: 23 MMOL/L — SIGNIFICANT CHANGE UP (ref 22–31)
CO2 SERPL-SCNC: 23 MMOL/L — SIGNIFICANT CHANGE UP (ref 22–31)
CREAT SERPL-MCNC: 1.17 MG/DL — SIGNIFICANT CHANGE UP (ref 0.5–1.3)
CREAT SERPL-MCNC: 1.17 MG/DL — SIGNIFICANT CHANGE UP (ref 0.5–1.3)
CULTURE RESULTS: SIGNIFICANT CHANGE UP
GLUCOSE BLDC GLUCOMTR-MCNC: 207 MG/DL — HIGH (ref 70–99)
GLUCOSE BLDC GLUCOMTR-MCNC: 238 MG/DL — HIGH (ref 70–99)
GLUCOSE BLDC GLUCOMTR-MCNC: 253 MG/DL — HIGH (ref 70–99)
GLUCOSE SERPL-MCNC: 220 MG/DL — HIGH (ref 70–99)
GLUCOSE SERPL-MCNC: 250 MG/DL — HIGH (ref 70–99)
HCT VFR BLD CALC: 37.7 % — LOW (ref 39–50)
HCT VFR BLD CALC: 38.3 % — LOW (ref 39–50)
HGB BLD-MCNC: 12.1 G/DL — LOW (ref 13–17)
HGB BLD-MCNC: 12.4 G/DL — LOW (ref 13–17)
MCHC RBC-ENTMCNC: 27.9 PG — SIGNIFICANT CHANGE UP (ref 27–34)
MCHC RBC-ENTMCNC: 28.3 PG — SIGNIFICANT CHANGE UP (ref 27–34)
MCHC RBC-ENTMCNC: 32.1 GM/DL — SIGNIFICANT CHANGE UP (ref 32–36)
MCHC RBC-ENTMCNC: 32.4 GM/DL — SIGNIFICANT CHANGE UP (ref 32–36)
MCV RBC AUTO: 87.1 FL — SIGNIFICANT CHANGE UP (ref 80–100)
MCV RBC AUTO: 87.4 FL — SIGNIFICANT CHANGE UP (ref 80–100)
NRBC # BLD: 0 /100 WBCS — SIGNIFICANT CHANGE UP (ref 0–0)
NRBC # BLD: 0 /100 WBCS — SIGNIFICANT CHANGE UP (ref 0–0)
PLATELET # BLD AUTO: 215 K/UL — SIGNIFICANT CHANGE UP (ref 150–400)
PLATELET # BLD AUTO: 235 K/UL — SIGNIFICANT CHANGE UP (ref 150–400)
POTASSIUM SERPL-MCNC: 3.9 MMOL/L — SIGNIFICANT CHANGE UP (ref 3.5–5.3)
POTASSIUM SERPL-MCNC: 4.2 MMOL/L — SIGNIFICANT CHANGE UP (ref 3.5–5.3)
POTASSIUM SERPL-SCNC: 3.9 MMOL/L — SIGNIFICANT CHANGE UP (ref 3.5–5.3)
POTASSIUM SERPL-SCNC: 4.2 MMOL/L — SIGNIFICANT CHANGE UP (ref 3.5–5.3)
RBC # BLD: 4.33 M/UL — SIGNIFICANT CHANGE UP (ref 4.2–5.8)
RBC # BLD: 4.38 M/UL — SIGNIFICANT CHANGE UP (ref 4.2–5.8)
RBC # FLD: 15.2 % — HIGH (ref 10.3–14.5)
RBC # FLD: 15.4 % — HIGH (ref 10.3–14.5)
RH IG SCN BLD-IMP: POSITIVE — SIGNIFICANT CHANGE UP
SODIUM SERPL-SCNC: 137 MMOL/L — SIGNIFICANT CHANGE UP (ref 135–145)
SODIUM SERPL-SCNC: 140 MMOL/L — SIGNIFICANT CHANGE UP (ref 135–145)
SPECIMEN SOURCE: SIGNIFICANT CHANGE UP
WBC # BLD: 11.1 K/UL — HIGH (ref 3.8–10.5)
WBC # BLD: 12.41 K/UL — HIGH (ref 3.8–10.5)
WBC # FLD AUTO: 11.1 K/UL — HIGH (ref 3.8–10.5)
WBC # FLD AUTO: 12.41 K/UL — HIGH (ref 3.8–10.5)

## 2021-06-24 PROCEDURE — 88300 SURGICAL PATH GROSS: CPT | Mod: 26

## 2021-06-24 PROCEDURE — 76000 FLUOROSCOPY <1 HR PHYS/QHP: CPT | Mod: 26

## 2021-06-24 PROCEDURE — 99232 SBSQ HOSP IP/OBS MODERATE 35: CPT

## 2021-06-24 PROCEDURE — 74176 CT ABD & PELVIS W/O CONTRAST: CPT | Mod: 26

## 2021-06-24 PROCEDURE — 50390 DRAINAGE OF KIDNEY LESION: CPT | Mod: RT,58,59

## 2021-06-24 PROCEDURE — 50389 REMOVE RENAL TUBE W/FLUORO: CPT | Mod: RT,58,59

## 2021-06-24 PROCEDURE — 71045 X-RAY EXAM CHEST 1 VIEW: CPT | Mod: 26

## 2021-06-24 PROCEDURE — 50433 PLMT NEPHROURETERAL CATHETER: CPT | Mod: RT,58,59

## 2021-06-24 PROCEDURE — 50080 PERQ NL/PL LITHOTRP SMPL<2CM: CPT | Mod: RT,58

## 2021-06-24 RX ORDER — INSULIN LISPRO 100/ML
6 VIAL (ML) SUBCUTANEOUS
Refills: 0 | Status: DISCONTINUED | OUTPATIENT
Start: 2021-06-24 | End: 2021-06-25

## 2021-06-24 RX ORDER — SODIUM CHLORIDE 9 MG/ML
1000 INJECTION, SOLUTION INTRAVENOUS
Refills: 0 | Status: DISCONTINUED | OUTPATIENT
Start: 2021-06-24 | End: 2021-06-24

## 2021-06-24 RX ORDER — SENNA PLUS 8.6 MG/1
2 TABLET ORAL AT BEDTIME
Refills: 0 | Status: DISCONTINUED | OUTPATIENT
Start: 2021-06-24 | End: 2021-06-25

## 2021-06-24 RX ORDER — CEFAZOLIN SODIUM 1 G
1000 VIAL (EA) INJECTION EVERY 8 HOURS
Refills: 0 | Status: DISCONTINUED | OUTPATIENT
Start: 2021-06-24 | End: 2021-06-25

## 2021-06-24 RX ORDER — SODIUM CHLORIDE 9 MG/ML
1000 INJECTION, SOLUTION INTRAVENOUS
Refills: 0 | Status: DISCONTINUED | OUTPATIENT
Start: 2021-06-24 | End: 2021-06-25

## 2021-06-24 RX ORDER — INSULIN GLARGINE 100 [IU]/ML
20 INJECTION, SOLUTION SUBCUTANEOUS AT BEDTIME
Refills: 0 | Status: DISCONTINUED | OUTPATIENT
Start: 2021-06-24 | End: 2021-06-25

## 2021-06-24 RX ORDER — GLUCAGON INJECTION, SOLUTION 0.5 MG/.1ML
1 INJECTION, SOLUTION SUBCUTANEOUS ONCE
Refills: 0 | Status: DISCONTINUED | OUTPATIENT
Start: 2021-06-24 | End: 2021-06-25

## 2021-06-24 RX ORDER — ONDANSETRON 8 MG/1
4 TABLET, FILM COATED ORAL ONCE
Refills: 0 | Status: DISCONTINUED | OUTPATIENT
Start: 2021-06-24 | End: 2021-06-24

## 2021-06-24 RX ORDER — MORPHINE SULFATE 50 MG/1
4 CAPSULE, EXTENDED RELEASE ORAL
Refills: 0 | Status: DISCONTINUED | OUTPATIENT
Start: 2021-06-24 | End: 2021-06-24

## 2021-06-24 RX ORDER — DEXTROSE 50 % IN WATER 50 %
25 SYRINGE (ML) INTRAVENOUS ONCE
Refills: 0 | Status: DISCONTINUED | OUTPATIENT
Start: 2021-06-24 | End: 2021-06-25

## 2021-06-24 RX ORDER — DEXTROSE 50 % IN WATER 50 %
15 SYRINGE (ML) INTRAVENOUS ONCE
Refills: 0 | Status: DISCONTINUED | OUTPATIENT
Start: 2021-06-24 | End: 2021-06-25

## 2021-06-24 RX ORDER — INSULIN LISPRO 100/ML
VIAL (ML) SUBCUTANEOUS
Refills: 0 | Status: DISCONTINUED | OUTPATIENT
Start: 2021-06-24 | End: 2021-06-25

## 2021-06-24 RX ORDER — DEXTROSE 50 % IN WATER 50 %
12.5 SYRINGE (ML) INTRAVENOUS ONCE
Refills: 0 | Status: DISCONTINUED | OUTPATIENT
Start: 2021-06-24 | End: 2021-06-25

## 2021-06-24 RX ORDER — HEPARIN SODIUM 5000 [USP'U]/ML
5000 INJECTION INTRAVENOUS; SUBCUTANEOUS EVERY 8 HOURS
Refills: 0 | Status: DISCONTINUED | OUTPATIENT
Start: 2021-06-24 | End: 2021-06-25

## 2021-06-24 RX ORDER — ATORVASTATIN CALCIUM 80 MG/1
10 TABLET, FILM COATED ORAL AT BEDTIME
Refills: 0 | Status: DISCONTINUED | OUTPATIENT
Start: 2021-06-24 | End: 2021-06-25

## 2021-06-24 RX ORDER — ONDANSETRON 8 MG/1
4 TABLET, FILM COATED ORAL ONCE
Refills: 0 | Status: COMPLETED | OUTPATIENT
Start: 2021-06-24 | End: 2021-06-24

## 2021-06-24 RX ORDER — FENTANYL CITRATE 50 UG/ML
25 INJECTION INTRAVENOUS
Refills: 0 | Status: DISCONTINUED | OUTPATIENT
Start: 2021-06-24 | End: 2021-06-24

## 2021-06-24 RX ORDER — OXYCODONE HYDROCHLORIDE 5 MG/1
10 TABLET ORAL EVERY 4 HOURS
Refills: 0 | Status: DISCONTINUED | OUTPATIENT
Start: 2021-06-24 | End: 2021-06-25

## 2021-06-24 RX ORDER — GLYCERIN 1 %
1 DROPS OPHTHALMIC (EYE) EVERY 8 HOURS
Refills: 0 | Status: DISCONTINUED | OUTPATIENT
Start: 2021-06-24 | End: 2021-06-25

## 2021-06-24 RX ORDER — INSULIN GLARGINE 100 [IU]/ML
18 INJECTION, SOLUTION SUBCUTANEOUS AT BEDTIME
Refills: 0 | Status: DISCONTINUED | OUTPATIENT
Start: 2021-06-24 | End: 2021-06-24

## 2021-06-24 RX ORDER — POLYETHYLENE GLYCOL 3350 17 G/17G
17 POWDER, FOR SOLUTION ORAL DAILY
Refills: 0 | Status: DISCONTINUED | OUTPATIENT
Start: 2021-06-24 | End: 2021-06-25

## 2021-06-24 RX ORDER — ACETAMINOPHEN 500 MG
650 TABLET ORAL EVERY 6 HOURS
Refills: 0 | Status: DISCONTINUED | OUTPATIENT
Start: 2021-06-24 | End: 2021-06-25

## 2021-06-24 RX ORDER — OXYCODONE HYDROCHLORIDE 5 MG/1
5 TABLET ORAL EVERY 4 HOURS
Refills: 0 | Status: DISCONTINUED | OUTPATIENT
Start: 2021-06-24 | End: 2021-06-25

## 2021-06-24 RX ORDER — AMLODIPINE BESYLATE 2.5 MG/1
10 TABLET ORAL DAILY
Refills: 0 | Status: DISCONTINUED | OUTPATIENT
Start: 2021-06-24 | End: 2021-06-25

## 2021-06-24 RX ORDER — BENZOCAINE AND MENTHOL 5; 1 G/100ML; G/100ML
1 LIQUID ORAL
Refills: 0 | Status: DISCONTINUED | OUTPATIENT
Start: 2021-06-24 | End: 2021-06-25

## 2021-06-24 RX ORDER — INSULIN LISPRO 100/ML
VIAL (ML) SUBCUTANEOUS AT BEDTIME
Refills: 0 | Status: DISCONTINUED | OUTPATIENT
Start: 2021-06-24 | End: 2021-06-25

## 2021-06-24 RX ADMIN — Medication 6: at 19:56

## 2021-06-24 RX ADMIN — OXYCODONE HYDROCHLORIDE 5 MILLIGRAM(S): 5 TABLET ORAL at 15:16

## 2021-06-24 RX ADMIN — BENZOCAINE AND MENTHOL 1 LOZENGE: 5; 1 LIQUID ORAL at 13:15

## 2021-06-24 RX ADMIN — SODIUM CHLORIDE 3 MILLILITER(S): 9 INJECTION INTRAMUSCULAR; INTRAVENOUS; SUBCUTANEOUS at 06:36

## 2021-06-24 RX ADMIN — Medication 10 MILLIGRAM(S): at 05:59

## 2021-06-24 RX ADMIN — OXYCODONE HYDROCHLORIDE 5 MILLIGRAM(S): 5 TABLET ORAL at 14:46

## 2021-06-24 RX ADMIN — SODIUM CHLORIDE 75 MILLILITER(S): 9 INJECTION, SOLUTION INTRAVENOUS at 01:21

## 2021-06-24 RX ADMIN — Medication 100 MILLIGRAM(S): at 21:39

## 2021-06-24 RX ADMIN — Medication 1 DROP(S): at 06:45

## 2021-06-24 RX ADMIN — Medication 650 MILLIGRAM(S): at 20:15

## 2021-06-24 RX ADMIN — HEPARIN SODIUM 5000 UNIT(S): 5000 INJECTION INTRAVENOUS; SUBCUTANEOUS at 21:41

## 2021-06-24 RX ADMIN — BENZOCAINE AND MENTHOL 1 LOZENGE: 5; 1 LIQUID ORAL at 17:41

## 2021-06-24 RX ADMIN — Medication 4: at 06:46

## 2021-06-24 RX ADMIN — Medication 4: at 11:35

## 2021-06-24 RX ADMIN — INSULIN GLARGINE 20 UNIT(S): 100 INJECTION, SOLUTION SUBCUTANEOUS at 23:07

## 2021-06-24 RX ADMIN — Medication 6 UNIT(S): at 19:55

## 2021-06-24 RX ADMIN — POLYETHYLENE GLYCOL 3350 17 GRAM(S): 17 POWDER, FOR SOLUTION ORAL at 17:39

## 2021-06-24 RX ADMIN — ONDANSETRON 4 MILLIGRAM(S): 8 TABLET, FILM COATED ORAL at 17:40

## 2021-06-24 RX ADMIN — Medication 1 DROP(S): at 21:41

## 2021-06-24 RX ADMIN — SODIUM CHLORIDE 125 MILLILITER(S): 9 INJECTION, SOLUTION INTRAVENOUS at 17:41

## 2021-06-24 RX ADMIN — ATORVASTATIN CALCIUM 10 MILLIGRAM(S): 80 TABLET, FILM COATED ORAL at 21:41

## 2021-06-24 RX ADMIN — AMLODIPINE BESYLATE 10 MILLIGRAM(S): 2.5 TABLET ORAL at 05:58

## 2021-06-24 RX ADMIN — HEPARIN SODIUM 5000 UNIT(S): 5000 INJECTION INTRAVENOUS; SUBCUTANEOUS at 13:15

## 2021-06-24 RX ADMIN — Medication 100 MILLIGRAM(S): at 00:12

## 2021-06-24 RX ADMIN — SODIUM CHLORIDE 125 MILLILITER(S): 9 INJECTION, SOLUTION INTRAVENOUS at 10:06

## 2021-06-24 RX ADMIN — HEPARIN SODIUM 5000 UNIT(S): 5000 INJECTION INTRAVENOUS; SUBCUTANEOUS at 00:10

## 2021-06-24 RX ADMIN — Medication 650 MILLIGRAM(S): at 19:45

## 2021-06-24 RX ADMIN — Medication 100 MILLIGRAM(S): at 13:14

## 2021-06-24 RX ADMIN — Medication 1 DROP(S): at 13:17

## 2021-06-24 NOTE — PRE-ANESTHESIA EVALUATION ADULT - HEART RATE (BEATS/MIN)
though pt has depressed t's on ekg trop is dramatically lower than last troponin  will monitor on telemetry  - cont asa/plavix 101 - unknown etiology, diffuse maculopapular rash now resolved

## 2021-06-24 NOTE — PRE-ANESTHESIA EVALUATION ADULT - NSANTHOSAYNRD_GEN_A_CORE
neck = 19.5 inches/No. RODOLFO screening performed.  STOP BANG Legend: 0-2 = LOW Risk; 3-4 = INTERMEDIATE Risk; 5-8 = HIGH Risk
neck = 19.5 inches/No. RODOLFO screening performed.  STOP BANG Legend: 0-2 = LOW Risk; 3-4 = INTERMEDIATE Risk; 5-8 = HIGH Risk

## 2021-06-25 ENCOUNTER — TRANSCRIPTION ENCOUNTER (OUTPATIENT)
Age: 61
End: 2021-06-25

## 2021-06-25 VITALS
TEMPERATURE: 98 F | HEART RATE: 106 BPM | RESPIRATION RATE: 18 BRPM | DIASTOLIC BLOOD PRESSURE: 77 MMHG | SYSTOLIC BLOOD PRESSURE: 149 MMHG | OXYGEN SATURATION: 94 %

## 2021-06-25 LAB
ANION GAP SERPL CALC-SCNC: 15 MMOL/L — SIGNIFICANT CHANGE UP (ref 5–17)
BLD GP AB SCN SERPL QL: NEGATIVE — SIGNIFICANT CHANGE UP
BUN SERPL-MCNC: 14 MG/DL — SIGNIFICANT CHANGE UP (ref 7–23)
CALCIUM SERPL-MCNC: 8.8 MG/DL — SIGNIFICANT CHANGE UP (ref 8.4–10.5)
CHLORIDE SERPL-SCNC: 100 MMOL/L — SIGNIFICANT CHANGE UP (ref 96–108)
CO2 SERPL-SCNC: 22 MMOL/L — SIGNIFICANT CHANGE UP (ref 22–31)
CREAT SERPL-MCNC: 1.01 MG/DL — SIGNIFICANT CHANGE UP (ref 0.5–1.3)
GLUCOSE BLDC GLUCOMTR-MCNC: 189 MG/DL — HIGH (ref 70–99)
GLUCOSE BLDC GLUCOMTR-MCNC: 201 MG/DL — HIGH (ref 70–99)
GLUCOSE SERPL-MCNC: 229 MG/DL — HIGH (ref 70–99)
HCT VFR BLD CALC: 36.3 % — LOW (ref 39–50)
HGB BLD-MCNC: 11.7 G/DL — LOW (ref 13–17)
MCHC RBC-ENTMCNC: 28.1 PG — SIGNIFICANT CHANGE UP (ref 27–34)
MCHC RBC-ENTMCNC: 32.2 GM/DL — SIGNIFICANT CHANGE UP (ref 32–36)
MCV RBC AUTO: 87.1 FL — SIGNIFICANT CHANGE UP (ref 80–100)
NRBC # BLD: 0 /100 WBCS — SIGNIFICANT CHANGE UP (ref 0–0)
PLATELET # BLD AUTO: 236 K/UL — SIGNIFICANT CHANGE UP (ref 150–400)
POTASSIUM SERPL-MCNC: 3.3 MMOL/L — LOW (ref 3.5–5.3)
POTASSIUM SERPL-SCNC: 3.3 MMOL/L — LOW (ref 3.5–5.3)
RBC # BLD: 4.17 M/UL — LOW (ref 4.2–5.8)
RBC # FLD: 15.1 % — HIGH (ref 10.3–14.5)
RH IG SCN BLD-IMP: POSITIVE — SIGNIFICANT CHANGE UP
SODIUM SERPL-SCNC: 137 MMOL/L — SIGNIFICANT CHANGE UP (ref 135–145)
WBC # BLD: 11.72 K/UL — HIGH (ref 3.8–10.5)
WBC # FLD AUTO: 11.72 K/UL — HIGH (ref 3.8–10.5)

## 2021-06-25 PROCEDURE — 86850 RBC ANTIBODY SCREEN: CPT

## 2021-06-25 PROCEDURE — 74176 CT ABD & PELVIS W/O CONTRAST: CPT

## 2021-06-25 PROCEDURE — 86901 BLOOD TYPING SEROLOGIC RH(D): CPT

## 2021-06-25 PROCEDURE — 85027 COMPLETE CBC AUTOMATED: CPT

## 2021-06-25 PROCEDURE — 82365 CALCULUS SPECTROSCOPY: CPT

## 2021-06-25 PROCEDURE — 86769 SARS-COV-2 COVID-19 ANTIBODY: CPT

## 2021-06-25 PROCEDURE — 93005 ELECTROCARDIOGRAM TRACING: CPT

## 2021-06-25 PROCEDURE — 86900 BLOOD TYPING SEROLOGIC ABO: CPT

## 2021-06-25 PROCEDURE — C1729: CPT

## 2021-06-25 PROCEDURE — 80048 BASIC METABOLIC PNL TOTAL CA: CPT

## 2021-06-25 PROCEDURE — 99239 HOSP IP/OBS DSCHRG MGMT >30: CPT

## 2021-06-25 PROCEDURE — 76000 FLUOROSCOPY <1 HR PHYS/QHP: CPT

## 2021-06-25 PROCEDURE — 99232 SBSQ HOSP IP/OBS MODERATE 35: CPT | Mod: GC

## 2021-06-25 PROCEDURE — 82962 GLUCOSE BLOOD TEST: CPT

## 2021-06-25 PROCEDURE — C1758: CPT

## 2021-06-25 PROCEDURE — 85025 COMPLETE CBC W/AUTO DIFF WBC: CPT

## 2021-06-25 PROCEDURE — 87086 URINE CULTURE/COLONY COUNT: CPT

## 2021-06-25 PROCEDURE — 88300 SURGICAL PATH GROSS: CPT

## 2021-06-25 PROCEDURE — C1769: CPT

## 2021-06-25 PROCEDURE — 71045 X-RAY EXAM CHEST 1 VIEW: CPT

## 2021-06-25 PROCEDURE — C1726: CPT

## 2021-06-25 PROCEDURE — C1889: CPT

## 2021-06-25 PROCEDURE — C9399: CPT

## 2021-06-25 PROCEDURE — C1894: CPT

## 2021-06-25 RX ORDER — METOCLOPRAMIDE HCL 10 MG
10 TABLET ORAL ONCE
Refills: 0 | Status: DISCONTINUED | OUTPATIENT
Start: 2021-06-25 | End: 2021-06-25

## 2021-06-25 RX ORDER — SENNA PLUS 8.6 MG/1
2 TABLET ORAL
Qty: 0 | Refills: 0 | DISCHARGE
Start: 2021-06-25

## 2021-06-25 RX ORDER — SODIUM CHLORIDE 9 MG/ML
1000 INJECTION INTRAMUSCULAR; INTRAVENOUS; SUBCUTANEOUS ONCE
Refills: 0 | Status: DISCONTINUED | OUTPATIENT
Start: 2021-06-25 | End: 2021-06-25

## 2021-06-25 RX ORDER — ACETAMINOPHEN 500 MG
2 TABLET ORAL
Qty: 0 | Refills: 0 | DISCHARGE
Start: 2021-06-25

## 2021-06-25 RX ORDER — POTASSIUM CHLORIDE 20 MEQ
20 PACKET (EA) ORAL
Refills: 0 | Status: COMPLETED | OUTPATIENT
Start: 2021-06-25 | End: 2021-06-25

## 2021-06-25 RX ORDER — DULAGLUTIDE 4.5 MG/.5ML
0.75 INJECTION, SOLUTION SUBCUTANEOUS
Qty: 4 | Refills: 0
Start: 2021-06-25 | End: 2021-07-24

## 2021-06-25 RX ORDER — EMPAGLIFLOZIN 10 MG/1
1 TABLET, FILM COATED ORAL
Qty: 30 | Refills: 0
Start: 2021-06-25 | End: 2021-07-24

## 2021-06-25 RX ORDER — POLYETHYLENE GLYCOL 3350 17 G/17G
17 POWDER, FOR SOLUTION ORAL
Qty: 0 | Refills: 0 | DISCHARGE
Start: 2021-06-25

## 2021-06-25 RX ORDER — SITAGLIPTIN 50 MG/1
1 TABLET, FILM COATED ORAL
Qty: 0 | Refills: 0 | DISCHARGE

## 2021-06-25 RX ORDER — OXYCODONE HYDROCHLORIDE 5 MG/1
1 TABLET ORAL
Qty: 12 | Refills: 0
Start: 2021-06-25 | End: 2021-06-27

## 2021-06-25 RX ORDER — GLIMEPIRIDE 1 MG
1 TABLET ORAL
Qty: 0 | Refills: 0 | DISCHARGE

## 2021-06-25 RX ORDER — ONDANSETRON 8 MG/1
4 TABLET, FILM COATED ORAL ONCE
Refills: 0 | Status: COMPLETED | OUTPATIENT
Start: 2021-06-25 | End: 2021-06-25

## 2021-06-25 RX ADMIN — HEPARIN SODIUM 5000 UNIT(S): 5000 INJECTION INTRAVENOUS; SUBCUTANEOUS at 05:24

## 2021-06-25 RX ADMIN — HEPARIN SODIUM 5000 UNIT(S): 5000 INJECTION INTRAVENOUS; SUBCUTANEOUS at 13:16

## 2021-06-25 RX ADMIN — Medication 100 MILLIGRAM(S): at 13:16

## 2021-06-25 RX ADMIN — Medication 20 MILLIEQUIVALENT(S): at 13:20

## 2021-06-25 RX ADMIN — Medication 100 MILLIGRAM(S): at 05:24

## 2021-06-25 RX ADMIN — Medication 1 DROP(S): at 05:24

## 2021-06-25 RX ADMIN — Medication 20 MILLIEQUIVALENT(S): at 11:28

## 2021-06-25 RX ADMIN — AMLODIPINE BESYLATE 10 MILLIGRAM(S): 2.5 TABLET ORAL at 05:24

## 2021-06-25 RX ADMIN — ONDANSETRON 4 MILLIGRAM(S): 8 TABLET, FILM COATED ORAL at 01:45

## 2021-06-25 RX ADMIN — Medication 10 MILLIGRAM(S): at 05:24

## 2021-06-25 RX ADMIN — Medication 20 MILLIEQUIVALENT(S): at 16:39

## 2021-06-25 RX ADMIN — Medication 4: at 09:58

## 2021-06-25 RX ADMIN — POLYETHYLENE GLYCOL 3350 17 GRAM(S): 17 POWDER, FOR SOLUTION ORAL at 11:28

## 2021-06-25 RX ADMIN — Medication 2: at 14:06

## 2021-06-25 RX ADMIN — Medication 6 UNIT(S): at 14:05

## 2021-06-25 RX ADMIN — Medication 650 MILLIGRAM(S): at 08:27

## 2021-06-25 RX ADMIN — Medication 1 DROP(S): at 13:17

## 2021-06-25 RX ADMIN — Medication 6 UNIT(S): at 09:57

## 2021-06-25 RX ADMIN — Medication 650 MILLIGRAM(S): at 08:57

## 2021-06-25 NOTE — DISCHARGE NOTE PROVIDER - CARE PROVIDER_API CALL
Ulises Garcia)  Urology  [address], [address]  [place] [address]  Phone: [phone]  Fax: [phone]  Follow Up Time:     Yecenia Le)  EndocrinologyMetabDiabetes; Internal Medicine  [address] [place] [address], [address]  [place] [address]  Phone: [phone]  Fax: [phone]  Follow Up Time:

## 2021-06-25 NOTE — DISCHARGE NOTE NURSING/CASE MANAGEMENT/SOCIAL WORK - PATIENT PORTAL LINK FT
You can access the FollowMyHealth Patient Portal offered by [facility] by registering at the following website: [email or web address]. By joining [facility]’s FollowMyHealth portal, you will also be able to view your health information using other applications (apps) compatible with our system.

## 2021-06-25 NOTE — PROGRESS NOTE ADULT - ATTENDING COMMENTS
As above  Stone free on post op CT from yesterday  Remove nephroureteral tube  Possible discharge home today
As above  CT completed and review of images show one residual stone medial to existing tract  Will need second stage Right PCNL tomorrow  Discussed with patient  Pain control  IVFluids

## 2021-06-25 NOTE — DIETITIAN INITIAL EVALUATION ADULT. - OTHER INFO
Pt visited at bedside who reports improved appetite/PO intake; Pt is amenable to receive nutritional oral supplementation while in-house. Pt without current complaints of GI distress (nausea/vomiting/diarrhea/constipation); last BM x2 today per nursing flowsheets.     Pt with HbA1c of 10.6% indicating poor glycemic control. Pt reports taking Januvia and Glimepiride PTA; states that he does not take metformin. States that he does not regularly check his fingersticks. Pt lethargic and requested education on nutritional management of DM as written materials. Materials left at bedside for Pt. Pt without any food preference requests. Pt made aware RD remains available.     Current dosing weight: 98.8 Kg (6/24)  Pt reports UBW 97.7 Kg; Pt denies any recent weight loss despite reported poor PO intake.

## 2021-06-25 NOTE — CHART NOTE - NSCHARTNOTEFT_GEN_A_CORE
Patient seen and examined at bedside for mild tachycardia overnight.  Patient feels well, pain is controlled, denies cp, sob, palpitations, calf pain, headaches, dizziness.   Patient had previous ekg done throughout hospitalization that was read sinus tachycardia.     ICU Vital Signs Last 24 Hrs  T(C): 37.8 (25 Jun 2021 02:18), Max: 37.8 (24 Jun 2021 14:43)  T(F): 100 (25 Jun 2021 02:18), Max: 100 (24 Jun 2021 14:43)  HR: 108 (25 Jun 2021 01:06) (88 - 108)  BP: 143/77 (25 Jun 2021 01:06) (128/59 - 159/84)  BP(mean): 102 (24 Jun 2021 11:30) (84 - 102)  RR: 18 (25 Jun 2021 01:06) (18 - 21)  SpO2: 95% (25 Jun 2021 01:06) (93% - 98%)    General: NAD, Lying in bed comfortably  Neuro: A+Ox3, no focal deficits  Resp: No respiratory distress or accessory muscle use. no supplemental O2  CV: no JVD  Abd: Soft, NT/ND, no rebound/guarding  : +anna, yellow urine; R NT fruit punch  Skin: Intact, no breakdown  Musculoskeletal: All 4 extremities moving spontaneously, no limitations.    A/P: 61 y/o M s/p 2nd stage R PCNL 6/24 with mild tachycardia  -denies acute sx or complaints  -prior ekg from AM stable  -will continue to monitor  -discussed with Dr. Roper Patient seen and examined at bedside for mild tachycardia overnight.  Patient feels well, pain is controlled, denies cp, sob, palpitations, calf pain, headaches, dizziness.   Patient had previous ekg done throughout hospitalization that was read sinus tachycardia.     ICU Vital Signs Last 24 Hrs  T(C): 37.8 (25 Jun 2021 02:18), Max: 37.8 (24 Jun 2021 14:43)  T(F): 100 (25 Jun 2021 02:18), Max: 100 (24 Jun 2021 14:43)  HR: 108 (25 Jun 2021 01:06) (88 - 108)  BP: 143/77 (25 Jun 2021 01:06) (128/59 - 159/84)  BP(mean): 102 (24 Jun 2021 11:30) (84 - 102)  RR: 18 (25 Jun 2021 01:06) (18 - 21)  SpO2: 95% (25 Jun 2021 01:06) (93% - 98%)    General: NAD, Lying in bed comfortably  Neuro: A+Ox3, no focal deficits  Resp: No respiratory distress or accessory muscle use. no supplemental O2  CV: no JVD  Abd: Soft, NT/ND, no rebound/guarding  : +anna, yellow urine; R NT fruit punch  Skin: Intact, no breakdown  Musculoskeletal: All 4 extremities moving spontaneously, no limitations.    A/P: 59 y/o M s/p 2nd stage R PCNL 6/24 with mild tachycardia  -denies acute sx or complaints  -prior ekg from AM stable  -encourage incentive spirometry/OOB  -will continue to monitor  -discussed with Dr. Roper

## 2021-06-25 NOTE — DISCHARGE NOTE PROVIDER - CARE PROVIDERS DIRECT ADDRESSES
[email or web address]

## 2021-06-25 NOTE — DISCHARGE NOTE PROVIDER - NSDCMRMEDTOKEN_GEN_ALL_CORE_FT
acetaminophen 325 mg oral tablet: 2 tab(s) orally every 6 hours, As needed, Mild Pain (1 - 3)  amLODIPine 10 mg oral tablet: 1 tab(s) orally once a day  atorvastatin 10 mg oral tablet: 1 tab(s) orally once a day  enalapril 10 mg oral tablet: 1 tab(s) orally once a day  glimepiride 2 mg oral tablet: 1 tab(s) orally once a day - hold on am of surgery  Jardiance 10 mg oral tablet: 1 tab(s) orally once a day (in the morning)   loratadine 10 mg oral tablet: 1 tab(s) orally once a day, As Needed  oxyCODONE 5 mg oral tablet: 1 tab(s) orally every 6 hours, As Needed -Moderate Pain (4 - 6) MDD:4  polyethylene glycol 3350 oral powder for reconstitution: 17 gram(s) orally once a day  senna oral tablet: 2 tab(s) orally once a day (at bedtime), As needed, Constipation  Trulicity Pen 0.75 mg/0.5 mL subcutaneous solution: 0.75 milligram(s) subcutaneously once a week    acetaminophen 325 mg oral tablet: 2 tab(s) orally every 6 hours, As needed, Mild Pain (1 - 3)  amLODIPine 10 mg oral tablet: 1 tab(s) orally once a day  atorvastatin 10 mg oral tablet: 1 tab(s) orally once a day  enalapril 10 mg oral tablet: 1 tab(s) orally once a day  glimepiride 2 mg oral tablet: 1 tab(s) orally 2 times a day - hold on am of surgery  loratadine 10 mg oral tablet: 1 tab(s) orally once a day, As Needed  polyethylene glycol 3350 oral powder for reconstitution: 17 gram(s) orally once a day  senna oral tablet: 2 tab(s) orally once a day (at bedtime), As needed, Constipation  Trulicity Pen 0.75 mg/0.5 mL subcutaneous solution: 0.75 milligram(s) subcutaneously once a week

## 2021-06-25 NOTE — DISCHARGE NOTE PROVIDER - HOSPITAL COURSE
Underwent a R PCNL on 6/22 and second stage R PCNL on 6/24.  Post op course was stable for both procedures.  His NT was removed at bedside  on 6/25 as well as his anna catheter.  He voided and remained stable.  His cardiologist was called regarding his tachycardia, and he was cleared for discharge.    Underwent a R PCNL on 6/22 and second stage R PCNL on 6/24.  Post op course was stable for both procedures.  His NT was removed at bedside  on 6/25 as well as his anna catheter.  He voided and remained stable.  His cardiologist was called regarding his tachycardia, and he was cleared for discharge.     Patient will start Trulicity, once a week injectable, upon discharge. In addition, he will increase home glimiperide 2mg to twice daily, to be taken with breakfast and dinner.   Patient should also check finger sticks three times daily, and if ever feeling unwell.  Patient will follow up with Dr. Rome, endocrinology, as an outpatient. Underwent a R PCNL on 6/22 and second stage R PCNL on 6/24.  Post op course was stable for both procedures.  His NT was removed at bedside  on 6/25 as well as his anna catheter.  He voided and remained stable.  His cardiologist was called regarding his tachycardia, and he was cleared for discharge.     Patient will start Trulicity, once a week injectable, upon discharge. In addition, he will increase home glimiperide 2mg to twice daily, to be taken with breakfast and dinner.   Patient should also check finger sticks three times daily, and if ever feeling unwell.  Patient will follow up with Dr. Le, endocrinology, as an outpatient.

## 2021-06-25 NOTE — DIETITIAN INITIAL EVALUATION ADULT. - PERTINENT LABORATORY DATA
(6/25) Na 137, BUN 14, Cr 1.01, <H>, K+ 3.3<L>; POCT Blood Glucose x24 hrs: 201-253 mg/dL   (6/8) Hgb A1c 10.6%

## 2021-06-25 NOTE — PROGRESS NOTE ADULT - SUBJECTIVE AND OBJECTIVE BOX
Post op Check    Pt seen and examined without complaints. Pain is controlled. Denies SOB/CP/N/V.     Vital Signs Last 24 Hrs  T(C): 37.2 (24 Jun 2021 12:02), Max: 37.2 (23 Jun 2021 13:52)  T(F): 99 (24 Jun 2021 12:02), Max: 99 (23 Jun 2021 13:52)  HR: 98 (24 Jun 2021 12:02) (88 - 105)  BP: 152/71 (24 Jun 2021 11:30) (128/59 - 159/84)  BP(mean): 102 (24 Jun 2021 11:30) (84 - 102)  RR: 18 (24 Jun 2021 12:02) (18 - 21)  SpO2: 93% (24 Jun 2021 12:02) (93% - 98%)    I&O's Summary    23 Jun 2021 07:01  -  24 Jun 2021 07:00  --------------------------------------------------------  IN: 2915 mL / OUT: 2075 mL / NET: 840 mL    24 Jun 2021 07:01  -  24 Jun 2021 12:59  --------------------------------------------------------  IN: 125 mL / OUT: 500 mL / NET: -375 mL        Physical Exam  Gen: NAD  Abd: Soft, NT, ND  Back: R PCN in place, draining pink urine  : anna in place, urine clear yellow                          12.4   12.41 )-----------( 215      ( 24 Jun 2021 10:39 )             38.3       06-24    137  |  99  |  17  ----------------------------<  250<H>  4.2   |  23  |  1.17    Ca    8.9      24 Jun 2021 10:39        A/P: 60y Male s/p second stage R PCNL    -DVT prophylaxis/OOB  -Incentive spirometry  -Strict I&O's  -Analgesia and antiemetics as needed  -Diet  -AM labs  -CT in am  
 Post op Check    Pt seen and examined. Pain is mildly controlled. Complaining of right flank pain and sore throat. Tolerating liquids. Denies SOB/CP/N/V.     Vital Signs Last 24 Hrs  T(C): 36.5 (22 Jun 2021 18:45), Max: 37.3 (22 Jun 2021 15:54)  T(F): 97.7 (22 Jun 2021 18:45), Max: 99.1 (22 Jun 2021 15:54)  HR: 105 (22 Jun 2021 19:45) (101 - 109)  BP: 163/74 (22 Jun 2021 19:45) (159/98 - 175/79)  BP(mean): 107 (22 Jun 2021 19:45) (107 - 115)  RR: 20 (22 Jun 2021 19:45) (18 - 20)  SpO2: 97% (22 Jun 2021 19:45) (92% - 99%)    I&O's Summary    22 Jun 2021 07:01  -  22 Jun 2021 20:57  --------------------------------------------------------  IN: 250 mL / OUT: 400 mL / NET: -150 mL    I&O's Detail    22 Jun 2021 07:01  -  22 Jun 2021 20:58  --------------------------------------------------------  IN:    Lactated Ringers: 250 mL  Total IN: 250 mL    OUT:    Indwelling Catheter - Urethral (mL): 400 mL  Total OUT: 400 mL    Total NET: -150 mL      Physical Exam  Gen: awake alert NAD AXOX3  Pulm: No respiratory distress  Abd: morbidly obese, Soft, NT, ND  Back: R NT unclamped, draining merlot colored fluid   : anna in place draining clear fruit punch urine                           13.3   16.80 )-----------( 271      ( 22 Jun 2021 19:51 )             41.4       06-22    139  |  100  |  19  ----------------------------<  352<H>  4.0   |  21<L>  |  1.22    Ca    9.2      22 Jun 2021 19:51        
    Chief Complaint: Evaluating this 59 y/o M for uncontrolled Type 2 DM w/ hyperglycemia      Interval History: Feels tired s/p second stage R PCNL. Transportation here to pick him up for CT. Glucose remains above goal but has been NPO.    MEDICATIONS  (STANDING):  amLODIPine   Tablet 10 milliGRAM(s) Oral daily  atorvastatin 10 milliGRAM(s) Oral at bedtime  ceFAZolin   IVPB 2000 milliGRAM(s) IV Intermittent once  ceFAZolin   IVPB 1000 milliGRAM(s) IV Intermittent every 8 hours  dextrose 40% Gel 15 Gram(s) Oral once  dextrose 5%. 1000 milliLiter(s) (50 mL/Hr) IV Continuous <Continuous>  dextrose 5%. 1000 milliLiter(s) (100 mL/Hr) IV Continuous <Continuous>  dextrose 50% Injectable 25 Gram(s) IV Push once  dextrose 50% Injectable 12.5 Gram(s) IV Push once  dextrose 50% Injectable 25 Gram(s) IV Push once  enalapril 10 milliGRAM(s) Oral daily  glucagon  Injectable 1 milliGRAM(s) IntraMuscular once  heparin   Injectable 5000 Unit(s) SubCutaneous every 8 hours  insulin glargine Injectable (LANTUS) 20 Unit(s) SubCutaneous at bedtime  insulin lispro (ADMELOG) corrective regimen sliding scale   SubCutaneous three times a day before meals  insulin lispro (ADMELOG) corrective regimen sliding scale   SubCutaneous at bedtime  insulin lispro Injectable (ADMELOG) 6 Unit(s) SubCutaneous three times a day before meals  lactated ringers. 1000 milliLiter(s) (125 mL/Hr) IV Continuous <Continuous>  naphazoline/pheniramine Solution 1 Drop(s) Both EYES every 8 hours  polyethylene glycol 3350 17 Gram(s) Oral daily    MEDICATIONS  (PRN):  acetaminophen   Tablet .. 650 milliGRAM(s) Oral every 6 hours PRN Mild Pain (1 - 3)  benzocaine 15 mG/menthol 3.6 mG (Sugar-Free) Lozenge 1 Lozenge Oral every 2 hours PRN Sore Throat  oxyCODONE    IR 5 milliGRAM(s) Oral every 4 hours PRN Moderate Pain (4 - 6)  oxyCODONE    IR 10 milliGRAM(s) Oral every 4 hours PRN Severe Pain (7 - 10)  senna 2 Tablet(s) Oral at bedtime PRN Constipation      Allergies    metformin (Angioedema)  Seasonal Allergies (Rhinorrhea)    Intolerances      Review of Systems:  Constitutional: No fever  Eyes: No blurry vision  Cardiovascular: No chest pain  Respiratory: No SOB  GI: No abdominal pain, No nausea, No vomiting  Endocrine: as noted in HPI    All other negative      PHYSICAL EXAM:  VITALS: T(C): 37.3 (06-24-21 @ 16:08)  T(F): 99.2 (06-24-21 @ 16:08), Max: 100 (06-24-21 @ 14:43)  HR: 102 (06-24-21 @ 16:08) (88 - 105)  BP: 142/82 (06-24-21 @ 16:08) (128/59 - 159/84)  RR:  (18 - 21)  SpO2:  (93% - 98%)  Wt(kg): --  GENERAL: NAD at this time  EYES: EOMI, No proptosis  HEENT:  Atraumatic, Normocephalic,   RESPIRATORY: Clear to auscultation bilaterally, full excursion, non labored  CARDIOVASCULAR: Regular rhythm; normal S1/S2, no peripheral edema  GI: Soft, nontender, non distended, normal bowel sounds  SKIN: Warm and dry  PSYCH: normal affect, normal mood      POCT Blood Glucose.: 207 mg/dL (06-24-21 @ 06:23)  POCT Blood Glucose.: 206 mg/dL (06-23-21 @ 23:45)  POCT Blood Glucose.: 232 mg/dL (06-23-21 @ 22:28)  POCT Blood Glucose.: 283 mg/dL (06-23-21 @ 14:22)  POCT Blood Glucose.: 311 mg/dL (06-23-21 @ 08:52)  POCT Blood Glucose.: 311 mg/dL (06-23-21 @ 00:14)  POCT Blood Glucose.: 372 mg/dL (06-22-21 @ 21:51)  POCT Blood Glucose.: 373 mg/dL (06-22-21 @ 20:27)  POCT Blood Glucose.: 200 mg/dL (06-22-21 @ 14:58)        06-24    137  |  99  |  17  ----------------------------<  250<H>  4.2   |  23  |  1.17    EGFR if : 79  EGFR if non : 68    Ca    8.9      06-24          Thyroid Function Tests:                      
UROLOGY DAILY PROGRESS NOTE:     Subjective: Patient seen and examined at bedside. No overnight events.       Objective:  Vital signs  T(F): , Max: 99.1 (06-22-21 @ 15:54)  HR: 98 (06-23-21 @ 05:15)  BP: 143/78 (06-23-21 @ 05:15)  SpO2: 93% (06-23-21 @ 05:15)  Wt(kg): --    I&O's Summary    22 Jun 2021 07:01  -  23 Jun 2021 07:00  --------------------------------------------------------  IN: 2300 mL / OUT: 2290 mL / NET: 10 mL    I&O's Detail    22 Jun 2021 07:01  -  23 Jun 2021 07:00  --------------------------------------------------------  IN:    Lactated Ringers: 1500 mL    Oral Fluid: 800 mL  Total IN: 2300 mL    OUT:    Indwelling Catheter - Urethral (mL): 1675 mL    Nephrostomy Tube (mL): 615 mL  Total OUT: 2290 mL    Total NET: 10 mL          Gen: NAD  Pulm: No respiratory distress, no subcostal retractions  CV: RRR, no JVD  Abd: Soft, NT, ND  Back: NT draining red urine  : Hong clear urine    Labs:  06-22  16.80 / 41.4  /1.22                           13.3   16.80 )-----------( 271      ( 22 Jun 2021 19:51 )             41.4     06-22    139  |  100  |  19  ----------------------------<  352<H>  4.0   |  21<L>  |  1.22    Ca    9.2      22 Jun 2021 19:51        Urine Cx:  
Urology Preop Note     Diagnosis: kidney stones   Procedure: 2nd stage PCNL on the Right  Surgeon: Radha      T(C): 37.2 (06-23-21 @ 13:52), Max: 37.3 (06-23-21 @ 00:15)  HR: 102 (06-23-21 @ 13:52) (91 - 109)  BP: 140/84 (06-23-21 @ 13:52) (128/80 - 175/79)  RR: 18 (06-23-21 @ 13:52) (14 - 20)  SpO2: 93% (06-23-21 @ 13:52) (92% - 99%)  Wt(kg): --        Ucx:  Culture - Urine (06.08.21 @ 21:50)    Specimen Source: .Urine Clean Catch (Midstream)    Culture Results:   No growth        EKG: pending    CXR:  < from: Xray Chest 1 View AP/PA (06.22.21 @ 19:33) >  INTERPRETATION:  no pneumothorax    < end of copied text >        60yMale admitted for kidney stones , going to OR for 2nd stage R PCNL  - NPO p MN  - IV fluids  - consent obtained  - EKG pending   
Subjective: Pt seen and examined. No acute complaints. Overall feeling well. Denies chest pain, shortness of breath, abd pain, N/V or other acute complaint.     Objective    Vital signs  T(F): , Max: 99 (06-23-21 @ 08:32)  HR: 101 (06-24-21 @ 05:15)  BP: 159/84 (06-24-21 @ 05:15)  SpO2: 94% (06-24-21 @ 05:15)    Output     OUT:    Nephrostomy Tube (mL): 1125 mL    Voided (mL): 950 mL  Total OUT: 2075 mL    Total NET: -2075 mL      Gen: no acute distress  Pulm: no respiratory distress  Abd: soft, nontender, nondistended  Back: R NT in place with fruit punch colored urine. Dressing C/D/I. no hematoma or ecchymosis appreciated. no CVAT b/l.   : no suprapubic tenderness.    Labs      06-23 @ 07:15    WBC 11.58 / Hct 37.7  / SCr 1.15     06-22 @ 19:51    WBC 16.80 / Hct 41.4  / SCr 1.22       Urine Cx: Culture - Urine (06.23.21 @ 00:43)    Specimen Source: Kidney right kidney urine culture #1    Culture Results:   No growth    Stone Cx: pending   
The patient was seen and examined at bedside.  Denies complaints of chest pain, shortness of breath, nausea, acute pain.  The patient's anna catheter was removed this AM.    T(C): 36.9 (06-25-21 @ 05:50), Max: 37.8 (06-24-21 @ 14:43)  HR: 109 (06-25-21 @ 05:50) (88 - 109)  BP: 134/72 (06-25-21 @ 05:50) (128/59 - 159/84)  RR: 18 (06-25-21 @ 05:50) (18 - 21)  SpO2: 95% (06-25-21 @ 05:50) (93% - 98%)  Wt(kg): --    Physical Exam:    General: NAD, A+Ox3  Abdomen: soft, non-tender, non-distended  Back: dressing clean/dry/intact      06-24 @ 07:01  -  06-25 @ 07:00  --------------------------------------------------------  IN: 2105 mL / OUT: 2350 mL / NET: -245 mL      F - 650cc clear    R NT 400cc bloody  
    Chief Complaint: Evaluating this 59 y/o M for uncontrolled Type 2 DM w/ hyperglycemia      Interval History: Reports feeling well. Tolerating PO. Glucose levels slightly above goal. Pt for DC today    MEDICATIONS  (STANDING):  amLODIPine   Tablet 10 milliGRAM(s) Oral daily  atorvastatin 10 milliGRAM(s) Oral at bedtime  ceFAZolin   IVPB 2000 milliGRAM(s) IV Intermittent once  ceFAZolin   IVPB 1000 milliGRAM(s) IV Intermittent every 8 hours  dextrose 40% Gel 15 Gram(s) Oral once  dextrose 5%. 1000 milliLiter(s) (50 mL/Hr) IV Continuous <Continuous>  dextrose 5%. 1000 milliLiter(s) (100 mL/Hr) IV Continuous <Continuous>  dextrose 50% Injectable 25 Gram(s) IV Push once  dextrose 50% Injectable 12.5 Gram(s) IV Push once  dextrose 50% Injectable 25 Gram(s) IV Push once  enalapril 10 milliGRAM(s) Oral daily  glucagon  Injectable 1 milliGRAM(s) IntraMuscular once  heparin   Injectable 5000 Unit(s) SubCutaneous every 8 hours  insulin glargine Injectable (LANTUS) 20 Unit(s) SubCutaneous at bedtime  insulin lispro (ADMELOG) corrective regimen sliding scale   SubCutaneous three times a day before meals  insulin lispro (ADMELOG) corrective regimen sliding scale   SubCutaneous at bedtime  insulin lispro Injectable (ADMELOG) 6 Unit(s) SubCutaneous three times a day before meals  naphazoline/pheniramine Solution 1 Drop(s) Both EYES every 8 hours  polyethylene glycol 3350 17 Gram(s) Oral daily  sodium chloride 0.9% Bolus 1000 milliLiter(s) IV Bolus once    MEDICATIONS  (PRN):  acetaminophen   Tablet .. 650 milliGRAM(s) Oral every 6 hours PRN Mild Pain (1 - 3)  benzocaine 15 mG/menthol 3.6 mG (Sugar-Free) Lozenge 1 Lozenge Oral every 2 hours PRN Sore Throat  metoclopramide Injectable 10 milliGRAM(s) IV Push once PRN nausea  oxyCODONE    IR 5 milliGRAM(s) Oral every 4 hours PRN Moderate Pain (4 - 6)  oxyCODONE    IR 10 milliGRAM(s) Oral every 4 hours PRN Severe Pain (7 - 10)  senna 2 Tablet(s) Oral at bedtime PRN Constipation      Allergies    metformin (Angioedema)  Seasonal Allergies (Rhinorrhea)    Intolerances      Review of Systems:  Constitutional: No fever  Eyes: No blurry vision  Cardiovascular: No chest pain  Respiratory: No SOB  GI: No abdominal pain, No nausea, No vomiting  Endocrine: as noted in HPI    All other negative      PHYSICAL EXAM:  Vital Signs Last 24 Hrs  T(C): 37.1 (25 Jun 2021 17:03), Max: 37.8 (25 Jun 2021 02:18)  T(F): 98.7 (25 Jun 2021 17:03), Max: 100 (25 Jun 2021 02:18)  HR: 104 (25 Jun 2021 17:03) (98 - 114)  BP: 152/88 (25 Jun 2021 17:03) (127/79 - 152/88)  BP(mean): --  RR: 18 (25 Jun 2021 17:03) (18 - 18)  SpO2: 94% (25 Jun 2021 17:03) (93% - 97%)  GENERAL: NAD at this time  EYES: EOMI, No proptosis  HEENT:  Atraumatic, Normocephalic,   RESPIRATORY: Clear to auscultation bilaterally, full excursion, non labored  CARDIOVASCULAR: Regular rhythm; normal S1/S2, no peripheral edema  GI: Soft, nontender, non distended, normal bowel sounds  SKIN: Warm and dry  PSYCH: normal affect, normal mood      CAPILLARY BLOOD GLUCOSE      POCT Blood Glucose.: 189 mg/dL (25 Jun 2021 13:55)  POCT Blood Glucose.: 201 mg/dL (25 Jun 2021 09:39)  POCT Blood Glucose.: 238 mg/dL (24 Jun 2021 22:13)  POCT Blood Glucose.: 253 mg/dL (24 Jun 2021 19:48)        06-25    137  |  100  |  14  ----------------------------<  229<H>  3.3<L>   |  22  |  1.01    Ca    8.8      25 Jun 2021 07:05

## 2021-06-25 NOTE — DIETITIAN INITIAL EVALUATION ADULT. - PERTINENT MEDS FT
MEDICATIONS  (STANDING):  amLODIPine   Tablet 10 milliGRAM(s) Oral daily  atorvastatin 10 milliGRAM(s) Oral at bedtime  ceFAZolin   IVPB 2000 milliGRAM(s) IV Intermittent once  ceFAZolin   IVPB 1000 milliGRAM(s) IV Intermittent every 8 hours  dextrose 40% Gel 15 Gram(s) Oral once  dextrose 5%. 1000 milliLiter(s) (50 mL/Hr) IV Continuous <Continuous>  dextrose 5%. 1000 milliLiter(s) (100 mL/Hr) IV Continuous <Continuous>  dextrose 50% Injectable 25 Gram(s) IV Push once  dextrose 50% Injectable 12.5 Gram(s) IV Push once  dextrose 50% Injectable 25 Gram(s) IV Push once  enalapril 10 milliGRAM(s) Oral daily  glucagon  Injectable 1 milliGRAM(s) IntraMuscular once  heparin   Injectable 5000 Unit(s) SubCutaneous every 8 hours  insulin glargine Injectable (LANTUS) 20 Unit(s) SubCutaneous at bedtime  insulin lispro (ADMELOG) corrective regimen sliding scale   SubCutaneous three times a day before meals  insulin lispro (ADMELOG) corrective regimen sliding scale   SubCutaneous at bedtime  insulin lispro Injectable (ADMELOG) 6 Unit(s) SubCutaneous three times a day before meals  lactated ringers. 1000 milliLiter(s) (125 mL/Hr) IV Continuous <Continuous>  naphazoline/pheniramine Solution 1 Drop(s) Both EYES every 8 hours  polyethylene glycol 3350 17 Gram(s) Oral daily    MEDICATIONS  (PRN):  acetaminophen   Tablet .. 650 milliGRAM(s) Oral every 6 hours PRN Mild Pain (1 - 3)  benzocaine 15 mG/menthol 3.6 mG (Sugar-Free) Lozenge 1 Lozenge Oral every 2 hours PRN Sore Throat  metoclopramide Injectable 10 milliGRAM(s) IV Push once PRN nausea  oxyCODONE    IR 5 milliGRAM(s) Oral every 4 hours PRN Moderate Pain (4 - 6)  oxyCODONE    IR 10 milliGRAM(s) Oral every 4 hours PRN Severe Pain (7 - 10)  senna 2 Tablet(s) Oral at bedtime PRN Constipation

## 2021-06-25 NOTE — CHART NOTE - NSCHARTNOTEFT_GEN_A_CORE
Pt noted to be persistently tachycardic throughout hospital stay.  Pt making good UOP, denies pain, and EKG showed sinus tachycardia.  Spoke with pts PCP Dr. Raudel Acuna ([phone]) who states that the pt HR typically runs between 85-95bpm, though in 2017 and isolated value of 102 was observed.  He provider the name of the pt cardiologist, Dr. Baltazar Ratliff ([phone]), who I also spoke with.  Per Dr. Ratliff, pt typically has HRs in the high 90s, so he thinks the pt current tachycardia is not a significant change from baseline.  He is comfortable with the pt being discharged so long as he is feeling well and has outpatient f/u with himself.

## 2021-06-25 NOTE — PROGRESS NOTE ADULT - ASSESSMENT
60 year old male s/p right PCNL 6/22, s/p 2nd stage R PCNL 6/24    -AM labs  -trial of void, PVR  -nephrostomy tube removal today  -observe heart rate, possible medicine/cardiology consult if persists  -f/u endocrinology  -possible discharge planning with antibiotics today  -OOB/DVT prophylaxis  -incentive spirometry
61 yo M with T2DM (A1C 10.6 on orals). Also with HTN, HLD, recurrent nephrolithiasis per pt. Admitted for percutaneous nephrolithotomy. Endocrine consulted for T2DM management     #T2DM uncontrolled with hyperglycemia  while inpatient:  - goal glucose 100-180  - continue Lantus 20 units qhs and Admelog 6 units with meals  - moderate pre-meal correction scale and moderate HS correction scale  - check FS AC/HS  - carb consistent diet  - registered dietician eval (done)  Discharge  - Discharge on GLP-1 agonist sq weekly at starting dose (options include Trulicity, Ozempic, Bydureon + SGLT-2i (options include Farxiga Jardiance, Invokana) based on insurance coverage. Risks and benefits of both medications discussed in detail with pt. Continue home dose glimeperide. Received dietary education.  - outpatient f/u with Dr. Yecenia Le Address: [address] [place] [address] [address], [place] [id number]  Phone: [phone]    #HTN  goal BP<130/80 (currently above goal)  manage per primary team    #HLD  continue statin      Rosa Isela Tay DO, Endocrine Fellow   Pager [phone] from 9-5PM. After hours and on weekends please call [phone].  
A/P: 60y Male s/p R PCNL    -DVT prophylaxis/OOB  -Incentive spirometry  -Strict I&O's  -Analgesia and antiemetics as needed  -Diabetic Diet  -Lozenges ordered   -AM labs  -F/U CXR read   -CT AM   -?TOV in AM     
A/P: 60y Male s/p R PCNL POD1    -DVT prophylaxis/OOB  -Incentive spirometry  -Strict I&O's  -Analgesia and antiemetics as needed  -Diabetic Diet  -AM labs  -CT today  - cont abx  - f/u stone cultures   - TOV
A/P: 60y Male s/p R PCNL POD2    - NPO  - OR today for stage 2. Post Op Check after  - DVT prophylaxis/OOB  - Incentive spirometry  - Strict I&O's  - Analgesia and antiemetics as needed  - Diabetic Diet  - Appreciate endo recs  - AM labs  - cont abx  - f/u stone cultures   
59 yo M with T2DM (A1C 10.6 on orals). Also with HTN, HLD, recurrent nephrolithiasis per pt. Admitted for percutaneous nephrolithotomy. Endocrine consulted for T2DM management     #T2DM uncontrolled with hyperglycemia likely stress exacerbated from illness. Glucose levels above goal on moderate scales. Poor PO intake. NPO MN for OR tomorrow (scheduled for later afternoon per team)  - goal glucose 100-180  -Recommend Lantus 20 units qhs and Admelog 6 units with meals  - moderate pre-meal correction scale and moderate HS correction scale  - check FS AC/HS  - carb consistent diet  - registered dietician eval  Discharge  - Anticipate discharge on GLP-1 agonist + SGLT-2i with dietary modifications. Will finalize.  - outpatient f/u with Dr. Yecenia Le Address: [address] [place] [address] [address], [place] [address]  Phone: [phone]    #HTN  goal BP<130/80 (currently above goal)  manage per primary team    #HLD  continue statin        Palmer Ortiz D.O  [phone]

## 2021-06-25 NOTE — DIETITIAN INITIAL EVALUATION ADULT. - ORAL INTAKE PTA/DIET HISTORY
Of note, Pt lethargic at time of visit. Pt reports poor PO intake x3 months PTA secondary to decreased appetite; endorses consuming <50% of baseline meals. Pt reports following a regular diet PTA; stated, "...but I need to follow a diabetic diet." Denies any difficulties chewing or swallowing. Pt denies taking any vitamins or minerals PTA. Confirms NKFA.

## 2021-06-25 NOTE — DIETITIAN INITIAL EVALUATION ADULT. - CHIEF COMPLAINT
Per chart, "59 yo male with h/o Type 2 DM, HTN, HLD, spinal stenosis, obesity and right staghorn calculus is scheduled for Stage I Right Percutaneous Nephrolithotomy for Right Staghorn Stone on 6/22/21 and Stage 2 Right Percutaneous Nephrostolithotomy for Staghorn Stone on 6/24/2021."

## 2021-06-25 NOTE — DIETITIAN INITIAL EVALUATION ADULT. - ADD RECOMMEND
1. Continue Consistent Carbohydrate Diet {Evening Snack} 2. Recommend Glucerna x2/day (provides additional 220kcal, 10g pro per 8 oz. serving) 3. Adjustments to bowel regimen as appropriate 4. Obtain food preferences, honor as able 5. Continue to provide nutritionally relevant education as appropriate 6. Monitor weight trends, PO intake, GI function, electrolytes, and skin integrity

## 2021-06-25 NOTE — CHART NOTE - NSCHARTNOTEFT_GEN_A_CORE
Discussed with pharmacist that Trulicity covered for patient, however Jardiance, Farxiga, Invokana, and Steglarto for SGLT2i options are not.     Patient's current home regimen is glimiperide 2mg QD, no recorded episodes of hypoglycemia.    Discussed with endo fellow, Dr. Murillo, will start trulicity as planned and will increase glimiperide 2mg to BID in the interim, and patient will follow up as outpatient for potential addition of SGLT2i in the future with Dr. Rome.

## 2021-06-25 NOTE — DISCHARGE NOTE PROVIDER - NSDCCPCAREPLAN_GEN_ALL_CORE_FT
PRINCIPAL DISCHARGE DIAGNOSIS  Diagnosis: Kidney stones  Assessment and Plan of Treatment: Please follow up with Dr. Garcia in 1-2 weeks.  Call [phone] to schedule/confirm your appointment.  Call or follow up sooner with fevers, chills, nausea, vomiting, increasing pain, or with other concerns.

## 2021-06-30 LAB
NIDUS STONE QN: SIGNIFICANT CHANGE UP
SURGICAL PATHOLOGY STUDY: SIGNIFICANT CHANGE UP

## 2021-07-01 LAB — NIDUS STONE QN: SIGNIFICANT CHANGE UP

## 2021-07-09 ENCOUNTER — APPOINTMENT (OUTPATIENT)
Dept: UROLOGY | Facility: CLINIC | Age: 61
End: 2021-07-09
Payer: COMMERCIAL

## 2021-07-09 VITALS
OXYGEN SATURATION: 96 % | DIASTOLIC BLOOD PRESSURE: 85 MMHG | HEART RATE: 104 BPM | HEIGHT: 68 IN | SYSTOLIC BLOOD PRESSURE: 147 MMHG | TEMPERATURE: 98.1 F | BODY MASS INDEX: 33.34 KG/M2 | WEIGHT: 220 LBS | RESPIRATION RATE: 18 BRPM

## 2021-07-09 PROCEDURE — 99024 POSTOP FOLLOW-UP VISIT: CPT

## 2021-07-09 RX ORDER — ENALAPRIL MALEATE 5 MG/1
TABLET ORAL
Refills: 0 | Status: ACTIVE | COMMUNITY

## 2021-07-09 RX ORDER — GLIMEPIRIDE 4 MG/1
4 TABLET ORAL
Refills: 0 | Status: ACTIVE | COMMUNITY

## 2021-07-09 RX ORDER — LORATADINE 10 MG/1
CAPSULE, LIQUID FILLED ORAL
Refills: 0 | Status: ACTIVE | COMMUNITY

## 2021-07-09 RX ORDER — ATORVASTATIN CALCIUM 10 MG/1
10 TABLET, FILM COATED ORAL
Refills: 0 | Status: ACTIVE | COMMUNITY

## 2021-07-09 RX ORDER — LINAGLIPTIN 5 MG/1
5 TABLET, FILM COATED ORAL
Refills: 0 | Status: ACTIVE | COMMUNITY

## 2021-07-09 RX ORDER — AMLODIPINE BESYLATE 5 MG/1
TABLET ORAL
Refills: 0 | Status: ACTIVE | COMMUNITY

## 2021-07-14 LAB — SURGICAL PATHOLOGY STUDY: SIGNIFICANT CHANGE UP

## 2021-07-23 ENCOUNTER — RX RENEWAL (OUTPATIENT)
Age: 61
End: 2021-07-23

## 2021-07-31 NOTE — HISTORY OF PRESENT ILLNESS
[None] : None [FreeTextEntry1] : 60 year old male presents to follow up with kidney stones. Pt returns for metabolic evaluation and prevention of future stone disease following recent surgery for stone. At issue today is review of the stone analysis, risk factors for future stone episodes and establishing whether they have pure dietary, metabolic, or mixed causes for their stone risks which is unrelated to the surgical management of their stone disease.\par \par s/p right PCNL in two stages on 6/22/21 and 6/24/21\par stone analysis- 100 % Uric acid

## 2021-07-31 NOTE — PHYSICAL EXAM
[General Appearance - Well Developed] : well developed [Abdomen Soft] : soft [Edema] : no peripheral edema [] : no respiratory distress [Oriented To Time, Place, And Person] : oriented to person, place, and time [Normal Station and Gait] : the gait and station were normal for the patient's age [FreeTextEntry1] : right flank incision healed

## 2021-07-31 NOTE — ASSESSMENT
[FreeTextEntry1] : right flank incision healed\par \par Pt instructed to increase fluid intake. \par Restart potassium citrate.\par \par plan \par 1) 24 hr urine x 2\par 2) RTC in 2 months with Renal US

## 2021-09-24 ENCOUNTER — APPOINTMENT (OUTPATIENT)
Dept: UROLOGY | Facility: CLINIC | Age: 61
End: 2021-09-24
Payer: MEDICARE

## 2021-09-24 VITALS
HEIGHT: 68 IN | DIASTOLIC BLOOD PRESSURE: 88 MMHG | BODY MASS INDEX: 32.13 KG/M2 | RESPIRATION RATE: 14 BRPM | WEIGHT: 212 LBS | OXYGEN SATURATION: 95 % | SYSTOLIC BLOOD PRESSURE: 147 MMHG | TEMPERATURE: 97.8 F | HEART RATE: 90 BPM

## 2021-09-24 DIAGNOSIS — R82.6 ABNORMAL URINE LEVELS OF SUBSTANCES CHIEFLY NONMEDICINAL AS TO SOURCE: ICD-10-CM

## 2021-09-24 PROCEDURE — 99213 OFFICE O/P EST LOW 20 MIN: CPT

## 2021-09-24 NOTE — LETTER BODY
[Dear  ___] : Dear  [unfilled], [Consult Letter:] : I had the pleasure of evaluating your patient, [unfilled]. [Please see my note below.] : Please see my note below. [Consult Closing:] : Thank you very much for allowing me to participate in the care of this patient.  If you have any questions, please do not hesitate to contact me. [Sincerely,] : Sincerely, [FreeTextEntry2] : Dr. Raudel Acuna, DO\par [address]\[place] [address]\par Ph: [phone]\par Fax: [phone] [FreeTextEntry3] : Ulises Garcia M.D.\par

## 2021-09-24 NOTE — ASSESSMENT
[FreeTextEntry1] : Pt instructed to increase fluid intake. \par Restart potassium citrate 10mEq 2 tabs twice daily--be more consistent with taking it\par Reduce animal protein portions in diet\par \par 24 hr urine and renal sono before next visit\par f/u 3 months\par \par

## 2021-09-24 NOTE — HISTORY OF PRESENT ILLNESS
[None] : None [FreeTextEntry1] : 60 year old man\par Kidney stones\par \par s/p right PCNL in two stages on 6/22/21 and 6/24/21\par stone analysis- 100 % Uric acid \par \par 2 x 24 urines: Low volume, low pH 5.4, high supersaturation uric acid\par \par

## 2021-10-04 ENCOUNTER — RX RENEWAL (OUTPATIENT)
Age: 61
End: 2021-10-04

## 2022-02-11 ENCOUNTER — APPOINTMENT (OUTPATIENT)
Dept: UROLOGY | Facility: CLINIC | Age: 62
End: 2022-02-11
Payer: MEDICARE

## 2022-02-11 DIAGNOSIS — R82.993 HYPERURICOSURIA: ICD-10-CM

## 2022-02-11 DIAGNOSIS — N20.0 CALCULUS OF KIDNEY: ICD-10-CM

## 2022-02-11 PROCEDURE — 99214 OFFICE O/P EST MOD 30 MIN: CPT

## 2022-02-12 NOTE — PHYSICAL EXAM
[General Appearance - Well Developed] : well developed [Abdomen Soft] : soft [Abdomen Tenderness] : non-tender [Skin Color & Pigmentation] : normal skin color and pigmentation [Edema] : no peripheral edema [] : no respiratory distress [Oriented To Time, Place, And Person] : oriented to person, place, and time [Normal Station and Gait] : the gait and station were normal for the patient's age [FreeTextEntry1] : right flank incision healed

## 2022-02-12 NOTE — HISTORY OF PRESENT ILLNESS
[None] : None [FreeTextEntry1] : 61 year old male\par Kidney stones\par \par s/p right PCNL in two stages on 6/22/21 and 6/24/21\par stone analysis- 100 % Uric acid \par \par abdominal ultrasound 2/7/2022: negative for stones \par bladder ultrasound: normal \par \par 24-hour urine: Persistent low volumes\par

## 2022-02-12 NOTE — ASSESSMENT
[FreeTextEntry1] : Pt instructed to increase fluid intake. \par Reduce animal protein portions in diet\par \par 24 hr urine and renal sono before next visit\par f/u 5 months \par

## 2022-07-22 ENCOUNTER — APPOINTMENT (OUTPATIENT)
Dept: UROLOGY | Facility: CLINIC | Age: 62
End: 2022-07-22

## 2022-08-16 ENCOUNTER — RX RENEWAL (OUTPATIENT)
Age: 62
End: 2022-08-16

## 2022-11-14 ENCOUNTER — RX RENEWAL (OUTPATIENT)
Age: 62
End: 2022-11-14

## 2022-11-14 RX ORDER — POTASSIUM CITRATE 10 MEQ/1
10 MEQ TABLET, EXTENDED RELEASE ORAL TWICE DAILY
Qty: 360 | Refills: 0 | Status: ACTIVE | COMMUNITY
Start: 2022-03-04 | End: 1900-01-01